# Patient Record
Sex: FEMALE | Race: BLACK OR AFRICAN AMERICAN | NOT HISPANIC OR LATINO | URBAN - METROPOLITAN AREA
[De-identification: names, ages, dates, MRNs, and addresses within clinical notes are randomized per-mention and may not be internally consistent; named-entity substitution may affect disease eponyms.]

---

## 2023-02-23 LAB
ALBUMIN (G/DL) IN SER/PLAS: 4.1 G/DL (ref 3.4–5)
ANION GAP IN SER/PLAS: 12 MMOL/L (ref 10–20)
CALCIUM (MG/DL) IN SER/PLAS: 8.9 MG/DL (ref 8.6–10.6)
CARBON DIOXIDE, TOTAL (MMOL/L) IN SER/PLAS: 27 MMOL/L (ref 21–32)
CHLORIDE (MMOL/L) IN SER/PLAS: 102 MMOL/L (ref 98–107)
CREATININE (MG/DL) IN SER/PLAS: 1.03 MG/DL (ref 0.5–1.05)
GFR FEMALE: 59 ML/MIN/1.73M2
GLUCOSE (MG/DL) IN SER/PLAS: 85 MG/DL (ref 74–99)
PHOSPHATE (MG/DL) IN SER/PLAS: 2.5 MG/DL (ref 2.5–4.9)
POTASSIUM (MMOL/L) IN SER/PLAS: 3.7 MMOL/L (ref 3.5–5.3)
SODIUM (MMOL/L) IN SER/PLAS: 137 MMOL/L (ref 136–145)
UREA NITROGEN (MG/DL) IN SER/PLAS: 9 MG/DL (ref 6–23)

## 2023-02-24 LAB
APPEARANCE, URINE: CLEAR
BILIRUBIN, URINE: NEGATIVE
BLOOD, URINE: NEGATIVE
COLOR, URINE: YELLOW
GLUCOSE, URINE: NEGATIVE MG/DL
KETONES, URINE: NEGATIVE MG/DL
LEUKOCYTE ESTERASE, URINE: NEGATIVE
NITRITE, URINE: NEGATIVE
PH, URINE: 6 (ref 5–8)
PROTEIN, URINE: NEGATIVE MG/DL
SPECIFIC GRAVITY, URINE: 1.02 (ref 1–1.03)
URINE CULTURE: NORMAL
UROBILINOGEN, URINE: <2 MG/DL (ref 0–1.9)

## 2023-03-03 ENCOUNTER — DOCUMENTATION (OUTPATIENT)
Dept: CARE COORDINATION | Facility: CLINIC | Age: 68
End: 2023-03-03
Payer: MEDICARE

## 2023-03-03 ENCOUNTER — DOCUMENTATION (OUTPATIENT)
Dept: PRIMARY CARE | Facility: CLINIC | Age: 68
End: 2023-03-03
Payer: MEDICARE

## 2023-03-22 ENCOUNTER — TELEPHONE (OUTPATIENT)
Dept: PRIMARY CARE | Facility: CLINIC | Age: 68
End: 2023-03-22
Payer: MEDICARE

## 2023-03-22 PROBLEM — R23.8 SKIN IRRITATION: Status: ACTIVE | Noted: 2023-03-22

## 2023-03-22 PROBLEM — N28.9 RENAL INSUFFICIENCY: Status: ACTIVE | Noted: 2023-03-22

## 2023-03-22 PROBLEM — M25.512 LEFT SHOULDER PAIN: Status: ACTIVE | Noted: 2023-03-22

## 2023-03-22 PROBLEM — M54.9 BACK PAIN: Status: ACTIVE | Noted: 2023-03-22

## 2023-03-22 PROBLEM — K62.5 RECTAL BLEEDING: Status: ACTIVE | Noted: 2023-03-22

## 2023-03-22 PROBLEM — S39.012A STRAIN OF LUMBAR REGION: Status: ACTIVE | Noted: 2023-03-22

## 2023-03-22 PROBLEM — L30.9 DERMATITIS: Status: ACTIVE | Noted: 2023-03-22

## 2023-03-22 PROBLEM — M79.645 PAIN OF LEFT THUMB: Status: ACTIVE | Noted: 2023-03-22

## 2023-03-22 PROBLEM — M79.644 PAIN OF BOTH THUMBS: Status: ACTIVE | Noted: 2023-03-22

## 2023-03-22 PROBLEM — R05.9 COUGH IN ADULT: Status: ACTIVE | Noted: 2023-03-22

## 2023-03-22 PROBLEM — M79.645 PAIN OF BOTH THUMBS: Status: ACTIVE | Noted: 2023-03-22

## 2023-03-22 PROBLEM — R51.9 HEADACHE: Status: ACTIVE | Noted: 2023-03-22

## 2023-03-22 PROBLEM — L98.9 SKIN LESION OF FACE: Status: ACTIVE | Noted: 2023-03-22

## 2023-03-22 PROBLEM — R10.9 LT FLANK PAIN: Status: ACTIVE | Noted: 2023-03-22

## 2023-03-22 PROBLEM — R10.2 PELVIC PAIN IN FEMALE: Status: ACTIVE | Noted: 2023-03-22

## 2023-03-22 PROBLEM — E66.3 OVERWEIGHT WITH BODY MASS INDEX (BMI) OF 25 TO 25.9 IN ADULT: Status: ACTIVE | Noted: 2023-03-22

## 2023-03-22 PROBLEM — M79.644 PAIN OF RIGHT THUMB: Status: ACTIVE | Noted: 2023-03-22

## 2023-03-22 PROBLEM — M79.671 RIGHT FOOT PAIN: Status: ACTIVE | Noted: 2023-03-22

## 2023-03-22 PROBLEM — M54.50 CHRONIC BILATERAL LOW BACK PAIN WITHOUT SCIATICA: Status: ACTIVE | Noted: 2023-03-22

## 2023-03-22 PROBLEM — G89.29 CHRONIC BILATERAL LOW BACK PAIN WITHOUT SCIATICA: Status: ACTIVE | Noted: 2023-03-22

## 2023-03-22 PROBLEM — F11.90 OPIOID USE: Status: ACTIVE | Noted: 2023-03-22

## 2023-03-22 PROBLEM — M79.89 SWELLING OF THIGH: Status: ACTIVE | Noted: 2023-03-22

## 2023-03-22 PROBLEM — E23.0 HYPOPITUITARISM (MULTI): Status: ACTIVE | Noted: 2023-03-22

## 2023-03-22 PROBLEM — K21.9 GERD (GASTROESOPHAGEAL REFLUX DISEASE): Status: ACTIVE | Noted: 2023-03-22

## 2023-03-22 PROBLEM — E23.2 DIABETES INSIPIDUS (MULTI): Status: ACTIVE | Noted: 2023-03-22

## 2023-03-22 PROBLEM — G25.0 BENIGN HEAD TREMOR: Status: ACTIVE | Noted: 2023-03-22

## 2023-03-22 PROBLEM — M81.0 OSTEOPOROSIS: Status: ACTIVE | Noted: 2023-03-22

## 2023-03-22 PROBLEM — N63.0 LUMP, BREAST: Status: ACTIVE | Noted: 2023-03-22

## 2023-03-22 PROBLEM — H57.89 EYE REDNESS: Status: ACTIVE | Noted: 2023-03-22

## 2023-03-22 PROBLEM — U07.1 COVID-19 VIRUS INFECTION: Status: ACTIVE | Noted: 2023-03-22

## 2023-03-22 RX ORDER — DESONIDE 0.5 MG/G
CREAM TOPICAL
COMMUNITY
Start: 2022-05-25 | End: 2024-04-22 | Stop reason: SDUPTHER

## 2023-03-22 RX ORDER — HYDROCODONE BITARTRATE AND ACETAMINOPHEN 7.5; 325 MG/1; MG/1
1 TABLET ORAL EVERY 8 HOURS
COMMUNITY
End: 2023-05-11 | Stop reason: SDUPTHER

## 2023-03-22 RX ORDER — BENZONATATE 200 MG/1
200 CAPSULE ORAL 3 TIMES DAILY PRN
COMMUNITY

## 2023-03-22 RX ORDER — ALENDRONATE SODIUM 70 MG/1
TABLET ORAL
COMMUNITY

## 2023-03-22 RX ORDER — CLOBETASOL PROPIONATE 0.5 MG/G
OINTMENT TOPICAL
COMMUNITY
Start: 2020-12-30

## 2023-03-22 RX ORDER — VENLAFAXINE HYDROCHLORIDE 75 MG/1
1 TABLET, EXTENDED RELEASE ORAL DAILY
COMMUNITY
Start: 2020-07-10

## 2023-03-22 RX ORDER — DESMOPRESSIN ACETATE 0.1 MG/1
TABLET ORAL
COMMUNITY

## 2023-03-22 RX ORDER — ERGOCALCIFEROL 1.25 MG/1
CAPSULE ORAL
COMMUNITY

## 2023-03-22 RX ORDER — LEVOTHYROXINE SODIUM 112 UG/1
TABLET ORAL
COMMUNITY

## 2023-03-22 RX ORDER — HYDROCORTISONE 5 MG/1
TABLET ORAL
COMMUNITY

## 2023-03-22 RX ORDER — MELOXICAM 15 MG/1
1 TABLET ORAL DAILY
COMMUNITY
Start: 2020-12-09

## 2023-03-22 RX ORDER — NALOXONE HYDROCHLORIDE 4 MG/.1ML
0.1 SPRAY NASAL ONCE AS NEEDED
COMMUNITY
Start: 2021-03-22

## 2023-03-23 ENCOUNTER — OFFICE VISIT (OUTPATIENT)
Dept: PRIMARY CARE | Facility: CLINIC | Age: 68
End: 2023-03-23
Payer: MEDICARE

## 2023-03-23 VITALS
WEIGHT: 157 LBS | SYSTOLIC BLOOD PRESSURE: 121 MMHG | HEIGHT: 65 IN | OXYGEN SATURATION: 95 % | DIASTOLIC BLOOD PRESSURE: 77 MMHG | BODY MASS INDEX: 26.16 KG/M2 | HEART RATE: 72 BPM | TEMPERATURE: 98.3 F

## 2023-03-23 DIAGNOSIS — R05.8 POST-VIRAL COUGH SYNDROME: Primary | ICD-10-CM

## 2023-03-23 PROCEDURE — 1160F RVW MEDS BY RX/DR IN RCRD: CPT | Performed by: FAMILY MEDICINE

## 2023-03-23 PROCEDURE — 1036F TOBACCO NON-USER: CPT | Performed by: FAMILY MEDICINE

## 2023-03-23 PROCEDURE — 99213 OFFICE O/P EST LOW 20 MIN: CPT | Performed by: FAMILY MEDICINE

## 2023-03-23 PROCEDURE — 1159F MED LIST DOCD IN RCRD: CPT | Performed by: FAMILY MEDICINE

## 2023-03-23 RX ORDER — PREDNISONE 20 MG/1
20 TABLET ORAL 2 TIMES DAILY
Qty: 10 TABLET | Refills: 0 | Status: SHIPPED | OUTPATIENT
Start: 2023-03-23 | End: 2023-03-28

## 2023-03-23 ASSESSMENT — PATIENT HEALTH QUESTIONNAIRE - PHQ9
2. FEELING DOWN, DEPRESSED OR HOPELESS: NOT AT ALL
SUM OF ALL RESPONSES TO PHQ9 QUESTIONS 1 AND 2: 0
1. LITTLE INTEREST OR PLEASURE IN DOING THINGS: NOT AT ALL

## 2023-03-23 ASSESSMENT — PAIN SCALES - GENERAL: PAINLEVEL: 6

## 2023-03-23 ASSESSMENT — ENCOUNTER SYMPTOMS: SORE THROAT: 1

## 2023-03-23 NOTE — PATIENT INSTRUCTIONS
Ongoing cough since having Covid a month ago.  Will use prednisone for 5 days to help settle down inflammation in lungs.  Can still use robitussin if needed.  If not improving, will check CXR.  Call if any additional symptoms.

## 2023-03-24 ENCOUNTER — DOCUMENTATION (OUTPATIENT)
Dept: PRIMARY CARE | Facility: CLINIC | Age: 68
End: 2023-03-24
Payer: MEDICARE

## 2023-03-24 NOTE — PROGRESS NOTES
Patient has met target of no readmission for (30 ) days post (hospital, discharge and is graduated from Transitional Care Management program at this time.

## 2023-04-28 ENCOUNTER — OFFICE VISIT (OUTPATIENT)
Dept: PRIMARY CARE | Facility: CLINIC | Age: 68
End: 2023-04-28
Payer: MEDICARE

## 2023-04-28 VITALS
BODY MASS INDEX: 27.12 KG/M2 | HEART RATE: 71 BPM | SYSTOLIC BLOOD PRESSURE: 120 MMHG | OXYGEN SATURATION: 98 % | HEIGHT: 65 IN | WEIGHT: 162.8 LBS | DIASTOLIC BLOOD PRESSURE: 78 MMHG | TEMPERATURE: 97.5 F

## 2023-04-28 DIAGNOSIS — H66.90 ACUTE OTITIS MEDIA, UNSPECIFIED OTITIS MEDIA TYPE: ICD-10-CM

## 2023-04-28 DIAGNOSIS — R05.9 COUGH IN ADULT: Primary | ICD-10-CM

## 2023-04-28 PROCEDURE — 1160F RVW MEDS BY RX/DR IN RCRD: CPT | Performed by: NURSE PRACTITIONER

## 2023-04-28 PROCEDURE — 1036F TOBACCO NON-USER: CPT | Performed by: NURSE PRACTITIONER

## 2023-04-28 PROCEDURE — 99213 OFFICE O/P EST LOW 20 MIN: CPT | Performed by: NURSE PRACTITIONER

## 2023-04-28 PROCEDURE — 1159F MED LIST DOCD IN RCRD: CPT | Performed by: NURSE PRACTITIONER

## 2023-04-28 RX ORDER — DOXYCYCLINE 100 MG/1
100 CAPSULE ORAL 2 TIMES DAILY
Qty: 20 CAPSULE | Refills: 0 | Status: SHIPPED | OUTPATIENT
Start: 2023-04-28 | End: 2023-05-08

## 2023-04-28 ASSESSMENT — PAIN SCALES - GENERAL: PAINLEVEL: 2

## 2023-04-28 ASSESSMENT — PATIENT HEALTH QUESTIONNAIRE - PHQ9
SUM OF ALL RESPONSES TO PHQ9 QUESTIONS 1 AND 2: 0
2. FEELING DOWN, DEPRESSED OR HOPELESS: NOT AT ALL
1. LITTLE INTEREST OR PLEASURE IN DOING THINGS: NOT AT ALL

## 2023-04-28 NOTE — PATIENT INSTRUCTIONS
Doxycycline as prescribed for persisting symptoms.  Continue to use Tessalon as needed for dry cough. Cough may take several more weeks/months to completely resolve.  Follow up with any worsening of symptoms.

## 2023-04-28 NOTE — PROGRESS NOTES
"Subjective   Patient ID: Michael Glover is a 67 y.o. female who presents for Sinus Problem (Prior covid19 1/2023 sore throat).    Presents for sore throat and cough since covid infection in January.  Seen a few weeks ago and treated with prednisone. States did nothing for the cough. Uncertain of helped much with sore throat.  Cough remains dry at this time. Using Tessalon as needed. Triggered by talking too much. Does not limit exertion.  Sore throat with post nasal drainage. Feels tight in neck.   Denies any fever or SOB.         Review of Systems    Objective   /78 (BP Location: Right arm, Patient Position: Sitting, BP Cuff Size: Large adult)   Pulse 71   Temp 36.4 °C (97.5 °F) (Oral)   Ht 1.638 m (5' 4.5\")   Wt 73.8 kg (162 lb 12.8 oz)   SpO2 98%   BMI 27.51 kg/m²     Physical Exam  Vitals and nursing note reviewed.   Constitutional:       General: She is not in acute distress.     Appearance: Normal appearance.   HENT:      Head: Normocephalic.      Right Ear: Ear canal and external ear normal. A middle ear effusion is present. Tympanic membrane is injected.      Left Ear: Ear canal and external ear normal. A middle ear effusion is present.      Nose: Congestion present.      Mouth/Throat:      Mouth: Mucous membranes are moist.      Pharynx: Oropharynx is clear. No posterior oropharyngeal erythema.   Eyes:      Conjunctiva/sclera: Conjunctivae normal.   Cardiovascular:      Rate and Rhythm: Normal rate and regular rhythm.      Heart sounds: No murmur heard.  Pulmonary:      Effort: Pulmonary effort is normal. No respiratory distress.      Breath sounds: Normal breath sounds. No wheezing or rhonchi.   Lymphadenopathy:      Cervical: Cervical adenopathy present.         Assessment/Plan   Diagnoses and all orders for this visit:  Cough in adult  -     doxycycline (Vibramycin) 100 mg capsule; Take 1 capsule (100 mg) by mouth 2 times a day for 10 days.  Acute otitis media, unspecified otitis media " type  -     doxycycline (Vibramycin) 100 mg capsule; Take 1 capsule (100 mg) by mouth 2 times a day for 10 days.

## 2023-05-11 ENCOUNTER — OFFICE VISIT (OUTPATIENT)
Dept: PRIMARY CARE | Facility: CLINIC | Age: 68
End: 2023-05-11
Payer: MEDICARE

## 2023-05-11 VITALS
DIASTOLIC BLOOD PRESSURE: 80 MMHG | OXYGEN SATURATION: 98 % | HEART RATE: 85 BPM | WEIGHT: 160 LBS | HEIGHT: 64 IN | BODY MASS INDEX: 27.31 KG/M2 | RESPIRATION RATE: 16 BRPM | SYSTOLIC BLOOD PRESSURE: 120 MMHG

## 2023-05-11 DIAGNOSIS — M54.50 CHRONIC BILATERAL LOW BACK PAIN WITHOUT SCIATICA: ICD-10-CM

## 2023-05-11 DIAGNOSIS — E23.0 HYPOPITUITARISM (MULTI): ICD-10-CM

## 2023-05-11 DIAGNOSIS — G89.29 CHRONIC BILATERAL LOW BACK PAIN WITHOUT SCIATICA: ICD-10-CM

## 2023-05-11 DIAGNOSIS — G89.29 CHRONIC PAIN OF BOTH KNEES: Primary | ICD-10-CM

## 2023-05-11 DIAGNOSIS — M25.562 CHRONIC PAIN OF BOTH KNEES: Primary | ICD-10-CM

## 2023-05-11 DIAGNOSIS — J31.2 CHRONIC SORE THROAT: ICD-10-CM

## 2023-05-11 DIAGNOSIS — M25.561 CHRONIC PAIN OF BOTH KNEES: Primary | ICD-10-CM

## 2023-05-11 PROCEDURE — 1159F MED LIST DOCD IN RCRD: CPT | Performed by: FAMILY MEDICINE

## 2023-05-11 PROCEDURE — 99213 OFFICE O/P EST LOW 20 MIN: CPT | Performed by: FAMILY MEDICINE

## 2023-05-11 PROCEDURE — 1160F RVW MEDS BY RX/DR IN RCRD: CPT | Performed by: FAMILY MEDICINE

## 2023-05-11 PROCEDURE — 1036F TOBACCO NON-USER: CPT | Performed by: FAMILY MEDICINE

## 2023-05-11 RX ORDER — HYDROCODONE BITARTRATE AND ACETAMINOPHEN 7.5; 325 MG/1; MG/1
1 TABLET ORAL EVERY 8 HOURS
Qty: 20 TABLET | Refills: 0 | Status: SHIPPED | OUTPATIENT
Start: 2023-05-11 | End: 2023-07-31 | Stop reason: SDUPTHER

## 2023-05-11 ASSESSMENT — PATIENT HEALTH QUESTIONNAIRE - PHQ9
2. FEELING DOWN, DEPRESSED OR HOPELESS: NOT AT ALL
1. LITTLE INTEREST OR PLEASURE IN DOING THINGS: NOT AT ALL
SUM OF ALL RESPONSES TO PHQ9 QUESTIONS 1 AND 2: 0

## 2023-05-11 NOTE — PROGRESS NOTES
"Subjective   Patient ID: Michael Glover is a 67 y.o. female who presents for Knee Pain (Both knees hurt , right knee swolling).  Both knees hurt for a while, but getting worse.  Right is more than left.  Right is swelling.  No giving out   Has a right knee sleeve, wears at times.  No acute injury.    Sore throat still hurting, since January when had covid.  Feels like is closing inside  Some PND, but not all the time.  Has had ATB, but no feels different.    Knee Pain         Review of Systems    Objective   /80 (BP Location: Left arm, Patient Position: Sitting, BP Cuff Size: Adult)   Pulse 85   Resp 16   Ht 1.626 m (5' 4\")   Wt 72.6 kg (160 lb)   SpO2 98%   BMI 27.46 kg/m²     Physical Exam  Constitutional:       Appearance: Normal appearance.   Cardiovascular:      Rate and Rhythm: Normal rate and regular rhythm.      Heart sounds: No murmur heard.  Pulmonary:      Effort: Pulmonary effort is normal.      Breath sounds: Normal breath sounds.   Musculoskeletal:      Right knee: Swelling present. Normal range of motion.      Instability Tests: Medial Emma test negative and lateral Emma test negative.      Left knee: Swelling present. Normal range of motion.      Instability Tests: Medial Emma test negative and lateral Emma test negative.      Right lower leg: No edema.      Left lower leg: No edema.   Lymphadenopathy:      Cervical: No cervical adenopathy.   Neurological:      Mental Status: She is alert.           Assessment/Plan   Problem List Items Addressed This Visit          Nervous    Hypopituitarism (CMS/HCC)     Stable, followed by endocrinologist            Other    Chronic bilateral low back pain without sciatica     Using hydrocodone on limited bases.   Urine Drug Screen and Controlled Substance Agreement done 8/29/22           Relevant Medications    HYDROcodone-acetaminophen (Norco) 7.5-325 mg tablet     Other Visit Diagnoses       Chronic pain of both knees    -  Primary    " Relevant Orders    Referral to Orthopaedic Surgery    XR knee 3 views bilateral (Completed)    Chronic sore throat        Relevant Orders    Referral to ENT

## 2023-05-14 NOTE — ASSESSMENT & PLAN NOTE
Using hydrocodone on limited bases.   Urine Drug Screen and Controlled Substance Agreement done 8/29/22

## 2023-05-14 NOTE — PATIENT INSTRUCTIONS
Chronic, worsening knee pain.  Check X-Rays.  Refer to orthopedist.  Can use knee brace for support.    Ongoing sore throat  Refer to ENT.    For back pain, hydrocodone used on limited basis.   Urine Drug Screen and Controlled Substance Agreement done 8/29/22.

## 2023-06-09 ENCOUNTER — TELEPHONE (OUTPATIENT)
Dept: PRIMARY CARE | Facility: CLINIC | Age: 68
End: 2023-06-09

## 2023-06-15 ENCOUNTER — APPOINTMENT (OUTPATIENT)
Dept: PRIMARY CARE | Facility: CLINIC | Age: 68
End: 2023-06-15
Payer: MEDICARE

## 2023-07-31 ENCOUNTER — OFFICE VISIT (OUTPATIENT)
Dept: PRIMARY CARE | Facility: CLINIC | Age: 68
End: 2023-07-31
Payer: MEDICARE

## 2023-07-31 VITALS
BODY MASS INDEX: 28.51 KG/M2 | HEART RATE: 78 BPM | TEMPERATURE: 97.9 F | RESPIRATION RATE: 16 BRPM | OXYGEN SATURATION: 99 % | DIASTOLIC BLOOD PRESSURE: 80 MMHG | WEIGHT: 167 LBS | SYSTOLIC BLOOD PRESSURE: 120 MMHG | HEIGHT: 64 IN

## 2023-07-31 DIAGNOSIS — K14.6 BURNING TONGUE: ICD-10-CM

## 2023-07-31 DIAGNOSIS — G89.29 CHRONIC BILATERAL LOW BACK PAIN WITHOUT SCIATICA: ICD-10-CM

## 2023-07-31 DIAGNOSIS — J02.9 SORE THROAT: Primary | ICD-10-CM

## 2023-07-31 DIAGNOSIS — K21.9 GASTROESOPHAGEAL REFLUX DISEASE WITHOUT ESOPHAGITIS: ICD-10-CM

## 2023-07-31 DIAGNOSIS — M54.50 CHRONIC BILATERAL LOW BACK PAIN WITHOUT SCIATICA: ICD-10-CM

## 2023-07-31 DIAGNOSIS — R35.0 URINARY FREQUENCY: ICD-10-CM

## 2023-07-31 LAB
POC APPEARANCE, URINE: CLEAR
POC BILIRUBIN, URINE: NEGATIVE
POC BLOOD, URINE: NEGATIVE
POC COLOR, URINE: YELLOW
POC GLUCOSE, URINE: NEGATIVE MG/DL
POC KETONES, URINE: NEGATIVE MG/DL
POC LEUKOCYTES, URINE: NEGATIVE
POC NITRITE,URINE: NEGATIVE
POC PH, URINE: 7 PH
POC PROTEIN, URINE: NEGATIVE MG/DL
POC SPECIFIC GRAVITY, URINE: 1.02
POC UROBILINOGEN, URINE: 0.2 EU/DL

## 2023-07-31 PROCEDURE — 1160F RVW MEDS BY RX/DR IN RCRD: CPT | Performed by: FAMILY MEDICINE

## 2023-07-31 PROCEDURE — 1159F MED LIST DOCD IN RCRD: CPT | Performed by: FAMILY MEDICINE

## 2023-07-31 PROCEDURE — 1036F TOBACCO NON-USER: CPT | Performed by: FAMILY MEDICINE

## 2023-07-31 PROCEDURE — 81003 URINALYSIS AUTO W/O SCOPE: CPT | Performed by: FAMILY MEDICINE

## 2023-07-31 PROCEDURE — 99213 OFFICE O/P EST LOW 20 MIN: CPT | Performed by: FAMILY MEDICINE

## 2023-07-31 PROCEDURE — 1126F AMNT PAIN NOTED NONE PRSNT: CPT | Performed by: FAMILY MEDICINE

## 2023-07-31 RX ORDER — HYDROCODONE BITARTRATE AND ACETAMINOPHEN 7.5; 325 MG/1; MG/1
1 TABLET ORAL EVERY 8 HOURS
Qty: 20 TABLET | Refills: 0 | Status: SHIPPED | OUTPATIENT
Start: 2023-07-31 | End: 2023-11-15 | Stop reason: SDUPTHER

## 2023-07-31 ASSESSMENT — PAIN SCALES - GENERAL: PAINLEVEL: 0-NO PAIN

## 2023-07-31 ASSESSMENT — PATIENT HEALTH QUESTIONNAIRE - PHQ9
SUM OF ALL RESPONSES TO PHQ9 QUESTIONS 1 AND 2: 0
2. FEELING DOWN, DEPRESSED OR HOPELESS: NOT AT ALL
1. LITTLE INTEREST OR PLEASURE IN DOING THINGS: NOT AT ALL
SUM OF ALL RESPONSES TO PHQ9 QUESTIONS 1 AND 2: 0
1. LITTLE INTEREST OR PLEASURE IN DOING THINGS: NOT AT ALL
2. FEELING DOWN, DEPRESSED OR HOPELESS: NOT AT ALL

## 2023-07-31 ASSESSMENT — ENCOUNTER SYMPTOMS
SORE THROAT: 1
HEARTBURN: 1

## 2023-07-31 NOTE — PROGRESS NOTES
"Subjective   Patient ID: Michael Glover is a 67 y.o. female who presents for Heartburn and Sore Throat.  Went to ED over the weekend.  She  was having pain in throat and on tongue.  She had stopped protonix, so thought maybe GERD.  Since it was feeling like throat was tight, she went to  ED.  Exam was unremarkable, and CT  of soft tissues of neck was normal.  CBC similar to baseline  and CRP 1.0  She was given dexamethasone  and IV Toradol.  Told to restart protonix.  Was felling better.    Seemed like flared up again today.  Tongue burns, and throat is sore.  Does  not feel like  throat is closing.  Ears  also hurt.    Eye is still jumping, on left more than right.  Saw neurologist, will be seeing eye doctor.    Will be travelling, so would like refill of vicodin.   Uses infrequently, when  back pain flares up.    Having urinary frequency, but drinks a lot of water.  No burning or other UTI symptoms.    Heartburn  She complains of heartburn and a sore throat.   Sore Throat         Review of Systems   HENT:  Positive for sore throat.    Gastrointestinal:  Positive for heartburn.       Objective   /80 (BP Location: Left arm, Patient Position: Sitting, BP Cuff Size: Adult)   Pulse 78   Temp 36.6 °C (97.9 °F) (Oral)   Resp 16   Ht 1.626 m (5' 4\")   Wt 75.8 kg (167 lb)   SpO2 99%   BMI 28.67 kg/m²     Physical Exam  Vitals reviewed.   Constitutional:       Appearance: Normal appearance.   HENT:      Right Ear: Tympanic membrane normal.      Left Ear: Tympanic membrane normal.      Nose: Nose normal.      Mouth/Throat:      Mouth: Mucous membranes are moist.      Tongue: No lesions. Tongue does not deviate from midline.      Pharynx: No pharyngeal swelling, oropharyngeal exudate or posterior oropharyngeal erythema.   Cardiovascular:      Rate and Rhythm: Normal rate and regular rhythm.   Pulmonary:      Effort: Pulmonary effort is normal.      Breath sounds: Normal breath sounds.   Musculoskeletal:      " Cervical back: Normal range of motion and neck supple.   Lymphadenopathy:      Cervical: No cervical adenopathy.   Neurological:      Mental Status: She is alert.           Assessment/Plan   Problem List Items Addressed This Visit       Chronic bilateral low back pain without sciatica    Relevant Medications    HYDROcodone-acetaminophen (Norco) 7.5-325 mg tablet    GERD (gastroesophageal reflux disease)     Other Visit Diagnoses       Sore throat    -  Primary    Urinary frequency        Relevant Orders    POCT UA Automated manually resulted (Completed)    Burning tongue

## 2023-08-01 NOTE — PATIENT INSTRUCTIONS
Follow up from ED visit for sore throat and tongue pain.  Symptoms improved with toradol and dexamethasone, but returning again.  Exam is unremarkable.  Follow up with ENT.  Saw Dr. Watt previously.  Restart Protonix for GERD, which may be playing a part in symptoms.  Can use naproxen as needed, that had been prescribed previously.  If feel like throat closing or tongue swelling, go to ED.    Urinary frequency, with normal urinalysis.  No evidence of infection.    For intermittent severe back pain, hydrocodone refilled.  I have personally reviewed the OARRS report for this patient.  This report is documented into the EMR.  I have considered the risks of abuse, dependence, addiction and diversion.

## 2023-11-15 DIAGNOSIS — M54.50 CHRONIC BILATERAL LOW BACK PAIN WITHOUT SCIATICA: ICD-10-CM

## 2023-11-15 DIAGNOSIS — G89.29 CHRONIC BILATERAL LOW BACK PAIN WITHOUT SCIATICA: ICD-10-CM

## 2023-11-15 RX ORDER — HYDROCODONE BITARTRATE AND ACETAMINOPHEN 7.5; 325 MG/1; MG/1
1 TABLET ORAL EVERY 8 HOURS
Qty: 20 TABLET | Refills: 0 | Status: SHIPPED | OUTPATIENT
Start: 2023-11-15 | End: 2024-03-19 | Stop reason: SDUPTHER

## 2023-11-15 NOTE — TELEPHONE ENCOUNTER
I have personally reviewed the OARRS report for this patient.  This report is documented into the EMR.  I have considered the risks of abuse, dependence, addiction and diversion.  Needs  Urine Drug Screen and Controlled Substance Agreement

## 2023-12-06 ENCOUNTER — TELEPHONE (OUTPATIENT)
Dept: PRIMARY CARE | Facility: CLINIC | Age: 68
End: 2023-12-06
Payer: MEDICARE

## 2023-12-07 ENCOUNTER — OFFICE VISIT (OUTPATIENT)
Dept: PRIMARY CARE | Facility: CLINIC | Age: 68
End: 2023-12-07
Payer: MEDICARE

## 2023-12-07 ENCOUNTER — ANCILLARY PROCEDURE (OUTPATIENT)
Dept: RADIOLOGY | Facility: CLINIC | Age: 68
End: 2023-12-07
Payer: MEDICARE

## 2023-12-07 VITALS
HEART RATE: 75 BPM | OXYGEN SATURATION: 96 % | SYSTOLIC BLOOD PRESSURE: 120 MMHG | DIASTOLIC BLOOD PRESSURE: 85 MMHG | TEMPERATURE: 97.5 F | BODY MASS INDEX: 30.38 KG/M2 | WEIGHT: 177 LBS

## 2023-12-07 DIAGNOSIS — M54.2 NECK PAIN: Primary | ICD-10-CM

## 2023-12-07 DIAGNOSIS — M54.2 NECK PAIN: ICD-10-CM

## 2023-12-07 PROCEDURE — 72050 X-RAY EXAM NECK SPINE 4/5VWS: CPT | Performed by: RADIOLOGY

## 2023-12-07 PROCEDURE — 1126F AMNT PAIN NOTED NONE PRSNT: CPT | Performed by: FAMILY MEDICINE

## 2023-12-07 PROCEDURE — 1160F RVW MEDS BY RX/DR IN RCRD: CPT | Performed by: FAMILY MEDICINE

## 2023-12-07 PROCEDURE — 99213 OFFICE O/P EST LOW 20 MIN: CPT | Performed by: FAMILY MEDICINE

## 2023-12-07 PROCEDURE — 1159F MED LIST DOCD IN RCRD: CPT | Performed by: FAMILY MEDICINE

## 2023-12-07 PROCEDURE — 72050 X-RAY EXAM NECK SPINE 4/5VWS: CPT | Mod: FY

## 2023-12-07 PROCEDURE — 1036F TOBACCO NON-USER: CPT | Performed by: FAMILY MEDICINE

## 2023-12-07 ASSESSMENT — PATIENT HEALTH QUESTIONNAIRE - PHQ9
SUM OF ALL RESPONSES TO PHQ9 QUESTIONS 1 & 2: 0
1. LITTLE INTEREST OR PLEASURE IN DOING THINGS: NOT AT ALL
2. FEELING DOWN, DEPRESSED OR HOPELESS: NOT AT ALL

## 2023-12-07 NOTE — PROGRESS NOTES
"Subjective   Patient ID: Michael Glover is a 68 y.o. female who presents for No chief complaint on file..  Back  of neck swelling she noticed since last week.  Is burning sometimes, but mostly feels stiff.   Not real pain.  No pain or tingling down arms.  Hormone and other blood levels have been good.  BMD shows  osteoporosis, on  Fosamax.  CT of neck for soft tissue in July:\"Cervical spine and remaining osseous structures: Alignment is normal. No   discrete osteolytic or osteoblastic process.  Mild spondylotic changes in   the visualized spine\"          Review of Systems    Objective   /85   Pulse 75   Temp 36.4 °C (97.5 °F)   Wt 80.3 kg (177 lb)   SpO2 96%   BMI 30.38 kg/m²     Physical Exam  Vitals reviewed.   Constitutional:       Appearance: Normal appearance.   Musculoskeletal:      Cervical back: Swelling (over base of cervical spine. Soft tissue,not tender.) and tenderness (over lower cervical spine,mild) present. No spasms. Normal range of motion.   Neurological:      Mental Status: She is alert.           Assessment/Plan   Problem List Items Addressed This Visit    None         "

## 2023-12-07 NOTE — PATIENT INSTRUCTIONS
Neck stiffness  with soft tissue swelling at the base.  Will check cervical spine X-Ray.  Consider PT to help with posture and range of motion.

## 2023-12-15 ENCOUNTER — TELEPHONE (OUTPATIENT)
Dept: PRIMARY CARE | Facility: CLINIC | Age: 68
End: 2023-12-15
Payer: MEDICARE

## 2023-12-15 DIAGNOSIS — G89.29 CHRONIC BILATERAL LOW BACK PAIN WITHOUT SCIATICA: Primary | ICD-10-CM

## 2023-12-15 DIAGNOSIS — M54.2 NECK PAIN: ICD-10-CM

## 2023-12-15 DIAGNOSIS — M54.50 CHRONIC BILATERAL LOW BACK PAIN WITHOUT SCIATICA: Primary | ICD-10-CM

## 2023-12-19 ENCOUNTER — LAB REQUISITION (OUTPATIENT)
Dept: LAB | Facility: HOSPITAL | Age: 68
End: 2023-12-19
Payer: MEDICARE

## 2023-12-19 DIAGNOSIS — N39.0 URINARY TRACT INFECTION, SITE NOT SPECIFIED: ICD-10-CM

## 2023-12-19 PROCEDURE — 87186 SC STD MICRODIL/AGAR DIL: CPT

## 2023-12-19 PROCEDURE — 87086 URINE CULTURE/COLONY COUNT: CPT

## 2023-12-22 LAB — BACTERIA UR CULT: ABNORMAL

## 2023-12-23 DIAGNOSIS — K21.9 GASTRO-ESOPHAGEAL REFLUX DISEASE WITHOUT ESOPHAGITIS: ICD-10-CM

## 2023-12-24 RX ORDER — OMEPRAZOLE 40 MG/1
40 CAPSULE, DELAYED RELEASE ORAL DAILY
Qty: 90 CAPSULE | Refills: 1 | OUTPATIENT
Start: 2023-12-24

## 2023-12-24 NOTE — TELEPHONE ENCOUNTER
Chart reviewed.  Ms. Glover no showed her last apt.  She needs to be seen in office before further refills authorized by Dr. Watt.

## 2024-01-17 ENCOUNTER — EVALUATION (OUTPATIENT)
Dept: PHYSICAL THERAPY | Facility: CLINIC | Age: 69
End: 2024-01-17
Payer: MEDICARE

## 2024-01-17 DIAGNOSIS — M43.6 STIFFNESS OF CERVICAL SPINE: ICD-10-CM

## 2024-01-17 DIAGNOSIS — M54.2 CERVICAL SPINE PAIN: Primary | ICD-10-CM

## 2024-01-17 DIAGNOSIS — M54.2 NECK PAIN: ICD-10-CM

## 2024-01-17 PROCEDURE — 97161 PT EVAL LOW COMPLEX 20 MIN: CPT | Mod: GP

## 2024-01-17 PROCEDURE — 97110 THERAPEUTIC EXERCISES: CPT | Mod: GP

## 2024-01-17 NOTE — PROGRESS NOTES
Physical Therapy  Physical Therapy Orthopedic Evaluation    Patient Name: Michael Glover  MRN: [id number]  Today's Date: 1/18/2024       Insurance:  Visit [place]: [place] of [place]  Authorization info: Auth needed   Insurance Type: Payor: [employer] MEDICARE / Plan: [facility] MEDICARE ADVANTAGE / Product Type: *No Product type* /      Current Problem  1. Cervical spine pain        2. Neck pain  Referral to Physical Therapy      3. Stiffness of cervical spine            Referred by: Dr. Hannah Cast     General:  General  Reason for Referral: Cervical pain and stiffness  Referred By: Hannah Cast MD    Precautions:  Precautions  Precautions Comment: Thyroid disorder, Hx of brain surgery in 1999  Medical History Form: Reviewed (scanned into chart)    Subjective:   FERNIE: Pt reports to evaluation due to neck pain. Pt has had this pain for the last 3-4 weeks. Pt is feeling her pain right in the middle of her cervical spine. Pt denies any radicular symptoms down her arms or dizziness. Pt mainly notices her pain gets worse in the middle of the day. Pt has also had some swelling in her neck as well. Pt will feel stiffness when she rotates her head from side to side. Pt has been doing some exercises on her own but does not feel like they are helping too much. Pt had imaging that revealed some disc degeneration and stenosis at the C4-6 level.     Previous Med Management: Imaging, exercise    PMH: Nothing related to her neck. Had brain surgery in 1999     Aggravating Factors: Not sure it is just always there     Relieving Factors: Heat, topical pain reliever    Pain/Symptom Scale:  Current: 4/10  Worst: 7/10  Best: 1/10  Location/Nature: In the middle of her cervical spine and describes as sometimes burning or pinching  Meds: Tylenol     PLOF: Pt has been able to do all activities but she just has some pain  ADL: No problems  Work: retired   Athletics: Workout routine   Recreation/ Hobbies: Line dancing    Goals: Pt would like to decrease her  pain and work on strengthening     Language: English      Red Flags: Do you have any of the following? No  Fever/chills, unexplained weight changes, dizziness/fainting, unexplained change in bowel or bladder functions, unexplained malaise or muscle weakness, night pain/sweats, numbness or tingling    Objective:  Palpation/ Observation   Pt had slight increase in kyphosis of thoracic spine. Pt had increased cervical paraspinal tightness upon palpation.     Shoulder ROM  WFL in all directions     Cervical ROM   Flexion: 30  Extension:45   R rot: 60  L rot: 70   R side bend: 10  L side bend: 10    Outcome Measures:  Other Measures  Neck Disability Index: 15/50     Treatments:  Access Code: [email or web address]  URL: [email or web address]  Date: 01/18/2024  Prepared by: Christiano Sam    Exercises  - Seated Passive Cervical Retraction  - 1 x daily - 7 x weekly - 3 sets - 10 reps  - Seated Assisted Cervical Rotation with Towel  - 1 x daily - 7 x weekly - 3 sets - 10 reps  - Seated Scapular Retraction  - 1 x daily - 7 x weekly - 3 sets - 10 reps  - Supine Shoulder Horizontal Abduction with Resistance  - 1 x daily - 7 x weekly - 3 sets - 10 reps    EDUCATION: Home exercise program, plan of care, activity modifications, pain management, and injury pathology       Assessment:     Patient is a 68 year old female that presents to physical therapy evaluation today due to complaints of cervical pain and stiffness. Patient impairments include flexibility deficits of cervical spine, strength deficits in scapular musculature, and motor control deficits including lack of neck control. Due to these impairments, the patients has the following functional limitations: pain with cervical rotation, difficulty sleeping, decreased activity tolerance, and an overall decrease in functional mobility. Skilled therapy recommended in order to to address their impairments and progress towards the associated functional goals. Prognosis is  good secondary to their current presentation and client understanding. The pt demonstrates a good understanding of their current plan of care, rehab expectations, and prognosis.          Plan:     Planned Interventions include: therapeutic exercise, self-care home management, manual therapy, therapeutic activities, gait training, neuromuscular coordination, vasopneumatic, dry needling, aquatic therapy  Frequency: 1 x Week  Duration: 8 Weeks  Goals: Set and discussed today  Active       PT Problem       PT Goals       Start:  01/18/24       1. Pt will decrease NDI to 5/50 or better in order to demonstrate a decrease in neck pain   2. Pt will increase cervical rotation, flexion, and extension ROM by a minimum of 5 degrees in order to demonstrate an increase in functional ROM   3. Pt will demonstrate independence and compliance with HEP with no increase in pain in order to demonstrate an increase in functional mobility  4.  Patient will be able to sleep through the night without any increase in neck pain.  5.  Patient will be able to perform all activities without any increase in cervical spine pain.             Plan of care was developed with input and agreement by the patient      Christiano Sam, PT

## 2024-01-18 PROBLEM — M43.6 STIFFNESS OF CERVICAL SPINE: Status: ACTIVE | Noted: 2024-01-18

## 2024-01-18 PROBLEM — M54.2 CERVICAL SPINE PAIN: Status: ACTIVE | Noted: 2024-01-18

## 2024-02-08 ENCOUNTER — APPOINTMENT (OUTPATIENT)
Dept: PHYSICAL THERAPY | Facility: CLINIC | Age: 69
End: 2024-02-08
Payer: MEDICARE

## 2024-02-12 NOTE — PROGRESS NOTES
"  Physical Therapy  Physical Therapy Orthopedic Evaluation    Patient Name: Michael Glover  MRN: [id number]  Today's Date: 2/13/2024  Time Calculation  Start Time: 0700  Stop Time: 0745  Time Calculation (min): 45 min    Insurance:  Visit number: 2 of [place]  Authorization info: Auth needed   Insurance Type: Payor: [employer] MEDICARE / Plan: [employer] MEDICARE ADVANTAGE / Product Type: *No Product type* /      Current Problem  1. Stiffness of cervical spine  Follow Up In Physical Therapy      2. Cervical spine pain  Follow Up In Physical Therapy          Referred by: Dr. Hannah Cast     General:       Precautions:     Medical History Form: Reviewed (scanned into chart)    Subjective:   My neck hurts in the middle . Jesenia been doing my exercises and it helps.          Previous Med Management: Imaging, exercise    PMH: Nothing related to her neck. Had brain surgery in 1999     Aggravating Factors: Not sure it is just always there     Relieving Factors: Heat, topical pain reliever    Pain/Symptom Scale:  Current: 3/10  Worst: 7/10  Best: 1/10  Location/Nature: In the middle of her cervical spine and describes as sometimes burning or pinching  Meds: Tylenol           Objective:  Palpation/ Observation   Pt had slight increase in kyphosis of thoracic spine. Pt had increased cervical paraspinal tightness upon palpation.     Shoulder ROM  WFL in all directions     Cervical ROM   Flexion: 30  Extension:45   R rot: 60  L rot: 70   R side bend: 10  L side bend: 10    Outcome Measures:        Treatments:  Access Code: [email or web address]  URL: [email or web address]  Date: 01/18/2024  Prepared by: Christiano Sam    Manual (15')  Seated STM to B UTM   Supine STM to B UTM     Attended stim with thermaprobe to B UTM x 10 min with 5 bar heating    Mechanical traction 17#/0#  60\"/20\"  x 10 min     EDUCATION: Home exercise program, plan of care, activity modifications, pain management, and injury pathology       Assessment:     Patient " demonstrated improved cervical rotation and reports decreased tightness at end of session.      Plan:     Planned Interventions include: therapeutic exercise, self-care home management, manual therapy, therapeutic activities, gait training, neuromuscular coordination, vasopneumatic, dry needling, aquatic therapy  Frequency: 1 x Week  Duration: 8 Weeks  Goals: Set and discussed today  Active       PT Problem       PT Goals       Start:  01/18/24       1. Pt will decrease NDI to 5/50 or better in order to demonstrate a decrease in neck pain   2. Pt will increase cervical rotation, flexion, and extension ROM by a minimum of 5 degrees in order to demonstrate an increase in functional ROM   3. Pt will demonstrate independence and compliance with HEP with no increase in pain in order to demonstrate an increase in functional mobility  4.  Patient will be able to sleep through the night without any increase in neck pain.  5.  Patient will be able to perform all activities without any increase in cervical spine pain.             Plan of care was developed with input and agreement by the patient      Lucinda Massey PTA

## 2024-02-13 ENCOUNTER — TREATMENT (OUTPATIENT)
Dept: PHYSICAL THERAPY | Facility: CLINIC | Age: 69
End: 2024-02-13
Payer: MEDICARE

## 2024-02-13 DIAGNOSIS — M54.2 CERVICAL SPINE PAIN: ICD-10-CM

## 2024-02-13 DIAGNOSIS — M43.6 STIFFNESS OF CERVICAL SPINE: Primary | ICD-10-CM

## 2024-02-13 PROCEDURE — 97140 MANUAL THERAPY 1/> REGIONS: CPT | Mod: GP,59,CQ

## 2024-02-13 PROCEDURE — 97032 APPL MODALITY 1+ESTIM EA 15: CPT | Mod: GP,CQ

## 2024-02-13 PROCEDURE — 97012 MECHANICAL TRACTION THERAPY: CPT | Mod: GP,CQ

## 2024-02-20 ENCOUNTER — TREATMENT (OUTPATIENT)
Dept: PHYSICAL THERAPY | Facility: CLINIC | Age: 69
End: 2024-02-20
Payer: MEDICARE

## 2024-02-20 DIAGNOSIS — M54.2 CERVICAL SPINE PAIN: ICD-10-CM

## 2024-02-20 DIAGNOSIS — M43.6 STIFFNESS OF CERVICAL SPINE: ICD-10-CM

## 2024-02-20 PROCEDURE — 97012 MECHANICAL TRACTION THERAPY: CPT | Mod: GP

## 2024-02-20 PROCEDURE — 97110 THERAPEUTIC EXERCISES: CPT | Mod: GP

## 2024-02-20 PROCEDURE — 97032 APPL MODALITY 1+ESTIM EA 15: CPT | Mod: GP

## 2024-02-20 NOTE — PROGRESS NOTES
"  Physical Therapy  Physical Therapy Orthopedic Treatment    Patient Name: Michael Glover  MRN: [id number]  Today's Date: 2/20/2024  Time Calculation  Start Time: 0745  Stop Time: 0830  Time Calculation (min): 45 min    Insurance:  Visit number: 3 of [place]  Authorization info: Auth needed   Insurance Type: Payor: [employer] MEDICARE / Plan: [employer] MEDICARE ADVANTAGE / Product Type: *No Product type* /      Current Problem  1. Cervical spine pain  Follow Up In Physical Therapy      2. Stiffness of cervical spine  Follow Up In Physical Therapy            Referred by: Dr. Hannah Cast     General:  General  Reason for Referral: Cervical pain and stiffness  Referred By: Hannah Cast MD    Precautions:  Precautions  Precautions Comment: Thyroid disorder, Hx of brain surgery in 1999  Medical History Form: Reviewed (scanned into chart)    Subjective:   Pt has continued to have some discomfort and tightness in her neck that began to get worse last Friday     Objective:  Palpation/ Observation   Pt had slight increase in kyphosis of thoracic spine. Pt had increased cervical paraspinal tightness upon palpation.     Shoulder ROM  WFL in all directions     Cervical ROM   Flexion: 30  Extension:45   R rot: 60  L rot: 70   R side bend: 10  L side bend: 10    Outcome Measures:        Treatments:  Access Code: [email or web address]  URL: [email or web address]  Date: 01/18/2024  Prepared by: Christiano Sam    Manual     There ex  Seated banded ER with scap squeeze 2 x 10   Chin tuck in sitting 2 x 10   Seated AROM rotation 2 x 10     Attended stim with thermaprobe to B UTM x 10 min with 5 bar heating    Mechanical traction 17#/0#  60\"/20\"  x 15 min     EDUCATION: Home exercise program, plan of care, activity modifications, pain management, and injury pathology       Assessment:     Patient tolerated session well today.  Patient had continued relief of neck pain after mechanical traction and thermaprobe were used.  Patient appeared " to have improved active range of motion of cervical rotation today after treatment.  Patient continues to make good progress towards goals established and plan of care and should continue with skilled therapy.    Plan:     Planned Interventions include: therapeutic exercise, self-care home management, manual therapy, therapeutic activities, gait training, neuromuscular coordination, vasopneumatic, dry needling, aquatic therapy  Frequency: 1 x Week  Duration: 8 Weeks  Goals: Set and discussed today  Active       PT Problem       PT Goals       Start:  01/18/24       1. Pt will decrease NDI to 5/50 or better in order to demonstrate a decrease in neck pain   2. Pt will increase cervical rotation, flexion, and extension ROM by a minimum of 5 degrees in order to demonstrate an increase in functional ROM   3. Pt will demonstrate independence and compliance with HEP with no increase in pain in order to demonstrate an increase in functional mobility  4.  Patient will be able to sleep through the night without any increase in neck pain.  5.  Patient will be able to perform all activities without any increase in cervical spine pain.             Plan of care was developed with input and agreement by the patient      Christiano Sam, PT

## 2024-02-27 ENCOUNTER — TREATMENT (OUTPATIENT)
Dept: PHYSICAL THERAPY | Facility: CLINIC | Age: 69
End: 2024-02-27
Payer: MEDICARE

## 2024-02-27 DIAGNOSIS — M43.6 STIFFNESS OF CERVICAL SPINE: ICD-10-CM

## 2024-02-27 DIAGNOSIS — M54.2 CERVICAL SPINE PAIN: ICD-10-CM

## 2024-02-27 PROCEDURE — 97012 MECHANICAL TRACTION THERAPY: CPT | Mod: GP

## 2024-02-27 PROCEDURE — 97140 MANUAL THERAPY 1/> REGIONS: CPT | Mod: GP

## 2024-02-27 PROCEDURE — 97110 THERAPEUTIC EXERCISES: CPT | Mod: GP

## 2024-02-27 NOTE — PROGRESS NOTES
"  Physical Therapy  Physical Therapy Orthopedic Treatment    Patient Name: Michael Glover  MRN: [id number]  Today's Date: 2/27/2024  Time Calculation  Start Time: 0915  Stop Time: 1000  Time Calculation (min): 45 min    Insurance:  Visit [place]: [place]  Authorization info: Auth needed   Insurance Type: Payor: [employer] MEDICARE / Plan: [employer] MEDICARE ADVANTAGE / Product Type: *No Product type* /      Current Problem  1. Cervical spine pain  Follow Up In Physical Therapy      2. Stiffness of cervical spine  Follow Up In Physical Therapy            Referred by: Dr. Hannah Cast     General:  General  Reason for Referral: Cervical pain and stiffness  Referred By: Hannah Cast MD    Precautions:  Precautions  Precautions Comment: Thyroid disorder, Hx of brain surgery in 1999  Medical History Form: Reviewed (scanned into chart)    Subjective:   Pt has not had much pain since last session. Pt will get random moments of discomfort but it never lasts too long.     Objective:  Palpation/ Observation   Pt had slight increase in kyphosis of thoracic spine. Pt had increased cervical paraspinal tightness upon palpation.     Shoulder ROM  WFL in all directions     Cervical ROM   Flexion: 30  Extension:45   R rot: 60  L rot: 70   R side bend: 10  L side bend: 10    Outcome Measures:        Treatments:  Access Code: [email or web address]  URL: [email or web address]  Date: 01/18/2024  Prepared by: Christiano Sam    Manual   Cervical  paraspinal STM   Cervical distraction   Occipital release     There ex  Supine AROM rotation (rotation/flexion) 1 x 20   PROM of cervical spine     Mechanical traction 20#/8#  60\"/20\"  x 15 min     EDUCATION: Home exercise program, plan of care, activity modifications, pain management, and injury pathology       Assessment:     Patient tolerated session well today.  Patient continues to get cervical discomfort relief after mechanical traction was performed along with manual therapy.  Patient was " educated to continue to keep up with strengthening at home and we will progress next more strengthening next week.  Patient continues to make good progress towards goals established and plan of care and should continue with skilled therapy.    Plan:     Planned Interventions include: therapeutic exercise, self-care home management, manual therapy, therapeutic activities, gait training, neuromuscular coordination, vasopneumatic, dry needling, aquatic therapy  Frequency: 1 x Week  Duration: 8 Weeks  Goals: Set and discussed today  Active       PT Problem       PT Goals       Start:  01/18/24       1. Pt will decrease NDI to 5/50 or better in order to demonstrate a decrease in neck pain   2. Pt will increase cervical rotation, flexion, and extension ROM by a minimum of 5 degrees in order to demonstrate an increase in functional ROM   3. Pt will demonstrate independence and compliance with HEP with no increase in pain in order to demonstrate an increase in functional mobility  4.  Patient will be able to sleep through the night without any increase in neck pain.  5.  Patient will be able to perform all activities without any increase in cervical spine pain.             Plan of care was developed with input and agreement by the patient      Christiano Sam, PT

## 2024-03-03 NOTE — PROGRESS NOTES
"  Physical Therapy  Physical Therapy Orthopedic Treatment    Patient Name: Michael Glover  MRN: [id number]  Today's Date: 3/5/2024  Time Calculation  Start Time: 1040  Stop Time: 1130  Time Calculation (min): 50 min    Insurance:  Visit number: 5 of [place]  Authorization info: Auth needed   Insurance Type: Payor: [employer] MEDICARE / Plan: [employer] MEDICARE ADVANTAGE / Product Type: *No Product type* /      Current Problem  1. Stiffness of cervical spine        2. Cervical spine pain              Referred by: Dr. Hannah Cast     General:       Precautions:     Medical History Form: Reviewed (scanned into chart)    Subjective:   Patient reports that she has a little discomfort in the neck on Sat but could be due to trying improve posture.    Objective:  Palpation/ Observation   Pt had slight increase in kyphosis of thoracic spine. Pt had increased cervical paraspinal tightness upon palpation.     Shoulder ROM  WFL in all directions     Cervical ROM   Flexion: 30  Extension:45   R rot: 60  L rot: 70   R side bend: 10  L side bend: 10    Outcome Measures:        Treatments:  Access Code: [email or web address]  URL: [email or web address]  Date: 01/18/2024  Prepared by: Christiano Sam    Manual (15')  Cervical  paraspinal, UTM STM (seated)    Mechanical traction 20#/8#  60\"/20\"  x 15 min     Attended Stim with Thermaprobe x 10 min with 5 bar heating to B UTM    EDUCATION: Home exercise program, plan of care, activity modifications, pain management, and injury pathology       Assessment:     Patient tolerated session well today.  Patient continues to get cervical discomfort relief after mechanical traction was performed along with manual therapy and thermaprobe.    Patient continues to make good progress towards goals established and plan of care and should continue with skilled therapy.    Plan:   DILLAN Agee  Planned Interventions include: therapeutic exercise, self-care home management, manual therapy, therapeutic " activities, gait training, neuromuscular coordination, vasopneumatic, dry needling, aquatic therapy  Frequency: 1 x Week  Duration: 8 Weeks  Goals: Set and discussed today  Active       PT Problem       PT Goals       Start:  01/18/24       1. Pt will decrease NDI to 5/50 or better in order to demonstrate a decrease in neck pain   2. Pt will increase cervical rotation, flexion, and extension ROM by a minimum of 5 degrees in order to demonstrate an increase in functional ROM   3. Pt will demonstrate independence and compliance with HEP with no increase in pain in order to demonstrate an increase in functional mobility  4.  Patient will be able to sleep through the night without any increase in neck pain.  5.  Patient will be able to perform all activities without any increase in cervical spine pain.             Plan of care was developed with input and agreement by the patient      Lucinda Massey PTA

## 2024-03-05 ENCOUNTER — TREATMENT (OUTPATIENT)
Dept: PHYSICAL THERAPY | Facility: CLINIC | Age: 69
End: 2024-03-05
Payer: MEDICARE

## 2024-03-05 DIAGNOSIS — M43.6 STIFFNESS OF CERVICAL SPINE: Primary | ICD-10-CM

## 2024-03-05 DIAGNOSIS — M54.2 CERVICAL SPINE PAIN: ICD-10-CM

## 2024-03-05 PROCEDURE — 97012 MECHANICAL TRACTION THERAPY: CPT | Mod: GP,CQ

## 2024-03-05 PROCEDURE — 97032 APPL MODALITY 1+ESTIM EA 15: CPT | Mod: GP,CQ

## 2024-03-05 PROCEDURE — 97140 MANUAL THERAPY 1/> REGIONS: CPT | Mod: GP,59,CQ

## 2024-03-06 NOTE — PROGRESS NOTES
"  Physical Therapy  Physical Therapy Orthopedic Treatment    Patient Name: Michael Glover  MRN: [id number]  Today's Date: 3/7/2024  Time Calculation  Start Time: 1045  Stop Time: 1128  Time Calculation (min): 43 min    Insurance:  Visit number: 6 of [place]  Authorization info: Auth needed   Insurance Type: Payor: [email or web address] MEDICARE / Plan: [employer] MEDICARE ADVANTAGE / Product Type: *No Product type* /      Current Problem  1. Stiffness of cervical spine        2. Cervical spine pain              Referred by: Dr. Hannah Csat     General:       Precautions:     Medical History Form: Reviewed (scanned into chart)    Subjective:   Patient reports that she doesn't have much pain.  Just has soreness in middle just below neck    Objective:  Palpation/ Observation   Pt had slight increase in kyphosis of thoracic spine. Pt had increased cervical paraspinal tightness upon palpation.     Shoulder ROM  WFL in all directions     Cervical ROM   Flexion: 30  Extension:45   R rot: 60  L rot: 70   R side bend: 10  L side bend: 10    Outcome Measures:        Treatments:  Access Code: [email or web address]  URL: [email or web address]  Date: 01/18/2024  Prepared by: Christiano Sam    Manual (25')  Cervical  paraspinal, UTM STM (seated)  Prone thoracic mobs, STM to thoracic paraspinals    Mechanical traction 20#/8#  60\"/20\"  x 15 min         EDUCATION: Home exercise program, plan of care, activity modifications, pain management, and injury pathology       Assessment:     Patient tolerated session well today.  Patient continues to get cervical discomfort relief after mechanical traction   Also had decreased thoracic discomfort after manual work.    Patient continues to make good progress towards goals established and plan of care and should continue with skilled therapy.    Plan:   DILLAN Agee  Planned Interventions include: therapeutic exercise, self-care home management, manual therapy, therapeutic activities, gait training, neuromuscular " coordination, vasopneumatic, dry needling, aquatic therapy  Frequency: 1 x Week  Duration: 8 Weeks  Goals: Set and discussed today  Active       PT Problem       PT Goals       Start:  01/18/24       1. Pt will decrease NDI to 5/50 or better in order to demonstrate a decrease in neck pain   2. Pt will increase cervical rotation, flexion, and extension ROM by a minimum of 5 degrees in order to demonstrate an increase in functional ROM   3. Pt will demonstrate independence and compliance with HEP with no increase in pain in order to demonstrate an increase in functional mobility  4.  Patient will be able to sleep through the night without any increase in neck pain.  5.  Patient will be able to perform all activities without any increase in cervical spine pain.             Plan of care was developed with input and agreement by the patient      Lucinda Massey PTA

## 2024-03-07 ENCOUNTER — TREATMENT (OUTPATIENT)
Dept: PHYSICAL THERAPY | Facility: CLINIC | Age: 69
End: 2024-03-07
Payer: MEDICARE

## 2024-03-07 DIAGNOSIS — M43.6 STIFFNESS OF CERVICAL SPINE: Primary | ICD-10-CM

## 2024-03-07 DIAGNOSIS — M54.2 CERVICAL SPINE PAIN: ICD-10-CM

## 2024-03-07 PROCEDURE — 97012 MECHANICAL TRACTION THERAPY: CPT | Mod: GP,CQ

## 2024-03-07 PROCEDURE — 97140 MANUAL THERAPY 1/> REGIONS: CPT | Mod: GP,59,CQ

## 2024-03-11 ENCOUNTER — TREATMENT (OUTPATIENT)
Dept: PHYSICAL THERAPY | Facility: CLINIC | Age: 69
End: 2024-03-11
Payer: MEDICARE

## 2024-03-11 DIAGNOSIS — M43.6 STIFFNESS OF CERVICAL SPINE: Primary | ICD-10-CM

## 2024-03-11 DIAGNOSIS — M54.2 CERVICAL SPINE PAIN: ICD-10-CM

## 2024-03-11 PROCEDURE — 97140 MANUAL THERAPY 1/> REGIONS: CPT | Mod: GP

## 2024-03-11 PROCEDURE — 97012 MECHANICAL TRACTION THERAPY: CPT | Mod: GP

## 2024-03-11 NOTE — PROGRESS NOTES
"  Physical Therapy  Physical Therapy Orthopedic Treatment    Patient Name: Michael Glover  MRN: [id number]  Today's Date: 3/11/2024  Time Calculation  Start Time: 1345  Stop Time: 1430  Time Calculation (min): 45 min    Insurance:  Visit number: 7 of [place]  Authorization info: Auth needed   Insurance Type: Payor: [employer] MEDICARE / Plan: [employer] MEDICARE ADVANTAGE / Product Type: *No Product type* /      Current Problem  1. Stiffness of cervical spine        2. Cervical spine pain              Referred by: Dr. Hannah Cast     General:  General  Reason for Referral: Cervical pain and stiffness  Referred By: Hannah Cast MD    Precautions:  Precautions  Precautions Comment: Thyroid disorder, Hx of brain surgery in 1999  Medical History Form: Reviewed (scanned into chart)    Subjective:   Patient continues to have minimal discomfort day to day. Always feels better after her PT sessions     Objective:  Palpation/ Observation   Pt had slight increase in kyphosis of thoracic spine. Pt had increased cervical paraspinal tightness upon palpation.     Shoulder ROM  WFL in all directions     Cervical ROM   Flexion: 30  Extension:45   R rot: 60  L rot: 70   R side bend: 10  L side bend: 10    Outcome Measures:        Treatments:  Access Code: [id number]    Manual (25')  Cervical  paraspinal, UTM STM (seated)  PROM of cervical spine   Cervical lateral mobs     Mechanical traction 20#/8#  60\"/20\"  x 15 min         EDUCATION: Home exercise program, plan of care, activity modifications, pain management, and injury pathology       Assessment:     Patient tolerated session well today.  Patient continued to get complete relief from mechanical traction and manual was performed.  Patient was educated that we will continue to work on progressing strengthening next session.  Patient continues to make excellent progress towards goals established and plan of care and should continue with skilled therapy.  Plan:   DILLAN Agee  Planned Interventions " include: therapeutic exercise, self-care home management, manual therapy, therapeutic activities, gait training, neuromuscular coordination, vasopneumatic, dry needling, aquatic therapy  Frequency: 1 x Week  Duration: 8 Weeks  Goals: Set and discussed today  Active       PT Problem       PT Goals       Start:  01/18/24       1. Pt will decrease NDI to 5/50 or better in order to demonstrate a decrease in neck pain   2. Pt will increase cervical rotation, flexion, and extension ROM by a minimum of 5 degrees in order to demonstrate an increase in functional ROM   3. Pt will demonstrate independence and compliance with HEP with no increase in pain in order to demonstrate an increase in functional mobility  4.  Patient will be able to sleep through the night without any increase in neck pain.  5.  Patient will be able to perform all activities without any increase in cervical spine pain.             Plan of care was developed with input and agreement by the patient      Christiano Sam, PT

## 2024-03-13 ENCOUNTER — TREATMENT (OUTPATIENT)
Dept: PHYSICAL THERAPY | Facility: CLINIC | Age: 69
End: 2024-03-13
Payer: MEDICARE

## 2024-03-13 DIAGNOSIS — M43.6 STIFFNESS OF CERVICAL SPINE: Primary | ICD-10-CM

## 2024-03-13 DIAGNOSIS — M54.2 CERVICAL SPINE PAIN: ICD-10-CM

## 2024-03-13 PROCEDURE — 97110 THERAPEUTIC EXERCISES: CPT | Mod: GP

## 2024-03-13 PROCEDURE — 97012 MECHANICAL TRACTION THERAPY: CPT | Mod: GP

## 2024-03-13 NOTE — PROGRESS NOTES
"  Physical Therapy  Physical Therapy Orthopedic Treatment    Patient Name: Michael Glover  MRN: [id number]  Today's Date: 3/13/2024       Insurance:  Visit [place]: 8 of [place]  Authorization info: Auth needed   Insurance Type: Payor: [employer] MEDICARE / Plan: [facility] MEDICARE ADVANTAGE / Product Type: *No Product type* /      Current Problem  1. Stiffness of cervical spine        2. Cervical spine pain                Referred by: Dr. Hannah Cast     General:  General  Reason for Referral: Cervical pain and stiffness  Referred By: Hannah Cast MD    Precautions:  Precautions  Precautions Comment: Thyroid disorder, Hx of brain surgery in 1999  Medical History Form: Reviewed (scanned into chart)    Subjective:   Patient continues to have minimal neck pain. Pt will only feel some pain in her C7 region from time to time     Objective:  Palpation/ Observation   Pt had slight increase in kyphosis of thoracic spine. Pt had increased cervical paraspinal tightness upon palpation.     Shoulder ROM  WFL in all directions     Cervical ROM   Flexion: 30  Extension:45   R rot: 60  L rot: 70   R side bend: 10  L side bend: 10    Outcome Measures:        Treatments:  Access Code: [id number]    There ex:  AROM cervical rotation/flexion 2 x 10   Seated horizontal abduction (red) 2 x 10   Seated banded ER (red) 2 x 10   Banded row (light) 2 x 10   Standing pec stretch 2 x 30 sec     Mechanical traction 20#/8#  60\"/20\"  x 15 min         EDUCATION: Home exercise program, plan of care, activity modifications, pain management, and injury pathology       Assessment:     Patient tolerated session well today.  Patient was able to continue to work on scapular strengthening today after mechanical traction was performed.  Patient had no increase in cervical spine discomfort when performing strengthening.  Patient continues to make excellent progress towards goals established the plan of care and should continue with skilled therapy.  Plan:   Rows, " NV  Planned Interventions include: therapeutic exercise, self-care home management, manual therapy, therapeutic activities, gait training, neuromuscular coordination, vasopneumatic, dry needling, aquatic therapy  Frequency: 1 x Week  Duration: 8 Weeks  Goals: Set and discussed today  Active       PT Problem       PT Goals       Start:  01/18/24       1. Pt will decrease NDI to 5/50 or better in order to demonstrate a decrease in neck pain   2. Pt will increase cervical rotation, flexion, and extension ROM by a minimum of 5 degrees in order to demonstrate an increase in functional ROM   3. Pt will demonstrate independence and compliance with HEP with no increase in pain in order to demonstrate an increase in functional mobility  4.  Patient will be able to sleep through the night without any increase in neck pain.  5.  Patient will be able to perform all activities without any increase in cervical spine pain.             Plan of care was developed with input and agreement by the patient      Christiano Sam PT

## 2024-03-16 NOTE — PROGRESS NOTES
"  Physical Therapy  Physical Therapy Orthopedic Treatment    Patient Name: Michael Glover  MRN: [id number]  Today's Date: 3/18/2024  Time Calculation  Start Time: 1245  Stop Time: 1330  Time Calculation (min): 45 min    Insurance:  Visit number: 9 of 11  Authorization info: 1/17 - 5/27  Insurance Type: Payor: [employer] MEDICARE / Plan: [employer] MEDICARE ADVANTAGE / Product Type: *No Product type* /      Current Problem  1. Stiffness of cervical spine  Follow Up In Physical Therapy      2. Cervical spine pain  Follow Up In Physical Therapy              Referred by: Dr. Hannah Cast     General:       Precautions:     Medical History Form: Reviewed (scanned into chart)    Subjective:   Patient continues to have minimal neck pain. Had some increased discomfort after doing the exercises.  Jesenia only done them a couple of times since last visit.     Objective:  Palpation/ Observation   Pt had slight increase in kyphosis of thoracic spine. Pt had increased cervical paraspinal tightness upon palpation.     Shoulder ROM  WFL in all directions     Cervical ROM   Flexion: 30  Extension:45   R rot: 60  L rot: 70   R side bend: 10  L side bend: 10    Outcome Measures:        Treatments:  Access Code: [id number]    Mechanical traction 20#/8#  60\"/20\"  x 15 min     Manual:  seated STM/ DTM to B UTM and cervical paraspinals (15')    Attended stim with thermaprobe to B UTM with 5 bar heating x 10 min    EDUCATION: Home exercise program, plan of care, activity modifications, pain management, and injury pathology       Assessment:     Patient tolerated session well today.  Decreased soreness around C7 after treatment.  Discussed cutting back to every other day with strengthening to see if she has decreased soreness after.  Patient continues to make excellent progress towards goals established the plan of care and should continue with skilled therapy.  Plan:    Planned Interventions include: therapeutic exercise, self-care home management, " manual therapy, therapeutic activities, gait training, neuromuscular coordination, vasopneumatic, dry needling, aquatic therapy  Frequency: 1 x Week  Duration: 8 Weeks  Goals: Set and discussed today  Active       PT Problem       PT Goals       Start:  01/18/24       1. Pt will decrease NDI to 5/50 or better in order to demonstrate a decrease in neck pain   2. Pt will increase cervical rotation, flexion, and extension ROM by a minimum of 5 degrees in order to demonstrate an increase in functional ROM   3. Pt will demonstrate independence and compliance with HEP with no increase in pain in order to demonstrate an increase in functional mobility  4.  Patient will be able to sleep through the night without any increase in neck pain.  5.  Patient will be able to perform all activities without any increase in cervical spine pain.             Plan of care was developed with input and agreement by the patient      Lucinda Massey PTA

## 2024-03-18 ENCOUNTER — TREATMENT (OUTPATIENT)
Dept: PHYSICAL THERAPY | Facility: CLINIC | Age: 69
End: 2024-03-18
Payer: MEDICARE

## 2024-03-18 DIAGNOSIS — M43.6 STIFFNESS OF CERVICAL SPINE: Primary | ICD-10-CM

## 2024-03-18 DIAGNOSIS — M54.2 CERVICAL SPINE PAIN: ICD-10-CM

## 2024-03-18 PROCEDURE — 97012 MECHANICAL TRACTION THERAPY: CPT | Mod: GP,CQ

## 2024-03-18 PROCEDURE — 97032 APPL MODALITY 1+ESTIM EA 15: CPT | Mod: GP,CQ

## 2024-03-18 PROCEDURE — 97140 MANUAL THERAPY 1/> REGIONS: CPT | Mod: GP,59,CQ

## 2024-03-19 DIAGNOSIS — G89.29 CHRONIC BILATERAL LOW BACK PAIN WITHOUT SCIATICA: ICD-10-CM

## 2024-03-19 DIAGNOSIS — M54.50 CHRONIC BILATERAL LOW BACK PAIN WITHOUT SCIATICA: ICD-10-CM

## 2024-03-19 RX ORDER — HYDROCODONE BITARTRATE AND ACETAMINOPHEN 7.5; 325 MG/1; MG/1
1 TABLET ORAL EVERY 8 HOURS
Qty: 20 TABLET | Refills: 0 | Status: SHIPPED | OUTPATIENT
Start: 2024-03-19

## 2024-03-20 ENCOUNTER — TREATMENT (OUTPATIENT)
Dept: PHYSICAL THERAPY | Facility: CLINIC | Age: 69
End: 2024-03-20
Payer: MEDICARE

## 2024-03-20 DIAGNOSIS — M54.2 CERVICAL SPINE PAIN: ICD-10-CM

## 2024-03-20 DIAGNOSIS — M43.6 STIFFNESS OF CERVICAL SPINE: ICD-10-CM

## 2024-03-20 PROCEDURE — 97140 MANUAL THERAPY 1/> REGIONS: CPT | Mod: GP

## 2024-03-20 PROCEDURE — 97012 MECHANICAL TRACTION THERAPY: CPT | Mod: GP

## 2024-03-20 NOTE — PROGRESS NOTES
"  Physical Therapy  Physical Therapy Orthopedic Treatment    Patient Name: Michael Glover  MRN: [id number]  Today's Date: 3/20/2024  Time Calculation  Start Time: 1430  Stop Time: 1515  Time Calculation (min): 45 min    Insurance:  Visit number: 10 of 11  Authorization info: 1/17 - 5/27  Insurance Type: Payor: [employer] MEDICARE / Plan: [employer] MEDICARE ADVANTAGE / Product Type: *No Product type* /      Current Problem  1. Cervical spine pain  Follow Up In Physical Therapy      2. Stiffness of cervical spine  Follow Up In Physical Therapy              Referred by: Dr. Hannah Cast     General:  General  Reason for Referral: Cervical pain and stiffness  Referred By: Hannah Cast MD    Precautions:  Precautions  Precautions Comment: Thyroid disorder, Hx of brain surgery in 1999  Medical History Form: Reviewed (scanned into chart)    Subjective:   Patient continues to have minimal neck pain. She feels comfortable with today being her last appt.     Objective:  Palpation/ Observation   Pt had slight increase in kyphosis of thoracic spine. Pt had increased cervical paraspinal tightness upon palpation.     Shoulder ROM  WFL in all directions     Cervical ROM   Flexion: 30  Extension:45   R rot: 60  L rot: 70   R side bend: 10  L side bend: 10    Outcome Measures:  Other Measures  Neck Disability Index: 0/50     Treatments:  Access Code: [id number]    Mechanical traction 20#/8#  60\"/20\"  x 15 min     Manual:  supine STM/ DTM to B UTM and cervical paraspinals   Manual traction    Occipital release       EDUCATION: Home exercise program, plan of care, activity modifications, pain management, and injury pathology       Assessment:     Patient tolerated session well today.  Patient showed that she was able to accomplish all goals established and the plan of care by increase strength goals, increased range of motion, and improved outcome score, and improved function.  Patient was educated to reach out with any questions in the near " future.  Patient has made excellent progress during her episode of care and will be discharged today.      Discharge Summary      Discharge Summary: PT    Discharge Information: Date of discharge 3/20/24, Date of last visit 3/20/24, Date of evaluation 1/17/24, Number of attended visits 10, Referred by Dr. Cast, and Referred for Cervical spine pain    Therapy Summary: Achieved all goals     Discharge Status: Back to [facility]     Rehab Discharge Reason: Achieved all and/or the most significant goals(s)      Plan:    Planned Interventions include: therapeutic exercise, self-care home management, manual therapy, therapeutic activities, gait training, neuromuscular coordination, vasopneumatic, dry needling, aquatic therapy  Frequency: 1 x Week  Duration: 8 Weeks  Goals: Set and discussed today  Active       PT Problem       PT Goals       Start:  01/18/24       1. Pt will decrease NDI to 5/50 or better in order to demonstrate a decrease in neck pain   2. Pt will increase cervical rotation, flexion, and extension ROM by a minimum of 5 degrees in order to demonstrate an increase in functional ROM   3. Pt will demonstrate independence and compliance with HEP with no increase in pain in order to demonstrate an increase in functional mobility  4.  Patient will be able to sleep through the night without any increase in neck pain.  5.  Patient will be able to perform all activities without any increase in cervical spine pain.             Plan of care was developed with input and agreement by the patient      Christiano Sam PT

## 2024-04-10 ENCOUNTER — APPOINTMENT (OUTPATIENT)
Dept: PHYSICAL THERAPY | Facility: CLINIC | Age: 69
End: 2024-04-10
Payer: MEDICARE

## 2024-04-22 ENCOUNTER — OFFICE VISIT (OUTPATIENT)
Dept: PRIMARY CARE | Facility: CLINIC | Age: 69
End: 2024-04-22
Payer: MEDICARE

## 2024-04-22 VITALS
HEART RATE: 82 BPM | HEIGHT: 64 IN | WEIGHT: 167.6 LBS | SYSTOLIC BLOOD PRESSURE: 113 MMHG | DIASTOLIC BLOOD PRESSURE: 63 MMHG | BODY MASS INDEX: 28.61 KG/M2 | TEMPERATURE: 98.1 F | OXYGEN SATURATION: 97 %

## 2024-04-22 DIAGNOSIS — R21 RASH: ICD-10-CM

## 2024-04-22 DIAGNOSIS — G89.29 CHRONIC BILATERAL LOW BACK PAIN WITHOUT SCIATICA: Primary | ICD-10-CM

## 2024-04-22 DIAGNOSIS — M54.50 CHRONIC BILATERAL LOW BACK PAIN WITHOUT SCIATICA: Primary | ICD-10-CM

## 2024-04-22 DIAGNOSIS — R05.2 SUBACUTE COUGH: ICD-10-CM

## 2024-04-22 DIAGNOSIS — M25.561 CHRONIC PAIN OF BOTH KNEES: ICD-10-CM

## 2024-04-22 DIAGNOSIS — G89.29 CHRONIC PAIN OF BOTH KNEES: ICD-10-CM

## 2024-04-22 DIAGNOSIS — M25.562 CHRONIC PAIN OF BOTH KNEES: ICD-10-CM

## 2024-04-22 DIAGNOSIS — K21.9 GASTROESOPHAGEAL REFLUX DISEASE WITHOUT ESOPHAGITIS: ICD-10-CM

## 2024-04-22 LAB
AMPHETAMINES UR QL SCN: NORMAL
BARBITURATES UR QL SCN: NORMAL
BZE UR QL SCN: NORMAL
CANNABINOIDS UR QL SCN: NORMAL
CREAT UR-MCNC: 122.2 MG/DL (ref 20–320)
PCP UR QL SCN: NORMAL

## 2024-04-22 PROCEDURE — 80368 SEDATIVE HYPNOTICS: CPT

## 2024-04-22 PROCEDURE — 1159F MED LIST DOCD IN RCRD: CPT | Performed by: FAMILY MEDICINE

## 2024-04-22 PROCEDURE — 82570 ASSAY OF URINE CREATININE: CPT

## 2024-04-22 PROCEDURE — 1160F RVW MEDS BY RX/DR IN RCRD: CPT | Performed by: FAMILY MEDICINE

## 2024-04-22 PROCEDURE — 1123F ACP DISCUSS/DSCN MKR DOCD: CPT | Performed by: FAMILY MEDICINE

## 2024-04-22 PROCEDURE — 99214 OFFICE O/P EST MOD 30 MIN: CPT | Performed by: FAMILY MEDICINE

## 2024-04-22 PROCEDURE — 80346 BENZODIAZEPINES1-12: CPT

## 2024-04-22 PROCEDURE — 80361 OPIATES 1 OR MORE: CPT

## 2024-04-22 PROCEDURE — 80307 DRUG TEST PRSMV CHEM ANLYZR: CPT

## 2024-04-22 PROCEDURE — 80358 DRUG SCREENING METHADONE: CPT

## 2024-04-22 PROCEDURE — 80373 DRUG SCREENING TRAMADOL: CPT

## 2024-04-22 PROCEDURE — 1158F ADVNC CARE PLAN TLK DOCD: CPT | Performed by: FAMILY MEDICINE

## 2024-04-22 PROCEDURE — 3008F BODY MASS INDEX DOCD: CPT | Performed by: FAMILY MEDICINE

## 2024-04-22 PROCEDURE — 80354 DRUG SCREENING FENTANYL: CPT

## 2024-04-22 PROCEDURE — 80365 DRUG SCREENING OXYCODONE: CPT

## 2024-04-22 RX ORDER — PANTOPRAZOLE SODIUM 40 MG/1
40 TABLET, DELAYED RELEASE ORAL 2 TIMES DAILY
Qty: 60 TABLET | Refills: 1 | Status: SHIPPED | OUTPATIENT
Start: 2024-04-22 | End: 2024-05-15

## 2024-04-22 RX ORDER — CLOBETASOL PROPIONATE 0.5 MG/G
OINTMENT TOPICAL
OUTPATIENT
Start: 2024-04-22

## 2024-04-22 RX ORDER — DESONIDE 0.5 MG/G
CREAM TOPICAL 2 TIMES DAILY PRN
Qty: 60 G | Refills: 0 | Status: SHIPPED | OUTPATIENT
Start: 2024-04-22

## 2024-04-22 RX ORDER — DESONIDE 0.5 MG/G
CREAM TOPICAL 2 TIMES DAILY PRN
Qty: 60 G | Refills: 0 | Status: SHIPPED | OUTPATIENT
Start: 2024-04-22 | End: 2024-04-22

## 2024-04-22 ASSESSMENT — ENCOUNTER SYMPTOMS
OCCASIONAL FEELINGS OF UNSTEADINESS: 0
LOSS OF SENSATION IN FEET: 0
DEPRESSION: 0

## 2024-04-22 ASSESSMENT — ANXIETY QUESTIONNAIRES
4. TROUBLE RELAXING: NOT AT ALL
7. FEELING AFRAID AS IF SOMETHING AWFUL MIGHT HAPPEN: NOT AT ALL
1. FEELING NERVOUS, ANXIOUS, OR ON EDGE: NOT AT ALL
2. NOT BEING ABLE TO STOP OR CONTROL WORRYING: NOT AT ALL
6. BECOMING EASILY ANNOYED OR IRRITABLE: NOT AT ALL
3. WORRYING TOO MUCH ABOUT DIFFERENT THINGS: NOT AT ALL
5. BEING SO RESTLESS THAT IT IS HARD TO SIT STILL: NOT AT ALL
GAD7 TOTAL SCORE: 0

## 2024-04-22 NOTE — PROGRESS NOTES
Subjective   Patient ID: Michael Glover is a 68 y.o. female who presents for Med Management and Cough.  Has had cough for 2 weeks, comes and goes, is dry.  Doesn't feel like the cough is coming from her chest, feels like it is in  upper airway.  Has acid in throat at times  Prilosec helps.  Right nostril with pain inside, comes and goes.  Can be a little blood in it on tissue.    Pain medication is for neck and back, when it flares.  Did finish 11 days of therapy for neck and is doing better.  Back with low grade pain constantly, but will flare up randomly.  When  it gets  bad, vicodin helps.    Has a skin lesion on  her right upper shoulder she'd like checked.    Also notes getting itchy patches  again, had cream she'd like refilled.    OARRS:  Hannah Cast MD on 4/22/2024  8:09 AM  I have personally reviewed the OARRS report for Michael Glover. I have considered the risks of abuse, dependence, addiction and diversion and I believe that it is clinically appropriate for Michael Glover to be prescribed this medication    Is the patient prescribed a combination of a benzodiazepine and opioid?  No    Last Urine Drug Screen / ordered today: Yes  No results found for this or any previous visit (from past 8760 hour(s)).  N/A    Controlled Substance Agreement:  Date of the Last Agreement: 4/22/24  Reviewed Controlled Substance Agreement including but not limited to the benefits, risks, and alternatives to treatment with a Controlled Substance medication(s).    Opioids:  What is the patient's goal of therapy? Help manage pain when bad  Is this being achieved with current treatment? yes    I have calculated the patient's Morphine Dose Equivalent (MED):   I have considered referral to Pain Management and/or a specialist, and do not feel it is necessary at this time.    I feel that it is clinically indicated to continue this current medication regimen after consideration of alternative therapies, and other non-opioid  "treatment.    Opioid Risk Screening:  No data recorded    Pain Assessment:  No data recorded        Review of Systems    Objective   /63   Pulse 82   Temp 36.7 °C (98.1 °F)   Ht 1.626 m (5' 4\")   Wt 76 kg (167 lb 9.6 oz)   SpO2 97%   BMI 28.77 kg/m²     Physical Exam  Vitals reviewed.   Constitutional:       Appearance: Normal appearance.   Cardiovascular:      Rate and Rhythm: Normal rate and regular rhythm.      Heart sounds: No murmur heard.  Pulmonary:      Effort: Pulmonary effort is normal.      Breath sounds: Normal breath sounds.   Musculoskeletal:      Right lower leg: No edema.      Left lower leg: No edema.   Lymphadenopathy:      Cervical: No cervical adenopathy.   Skin:     Comments: Right  upper back with black head.  No evidence of infection.   Neurological:      Mental Status: She is alert.   Psychiatric:         Mood and Affect: Mood normal.         Behavior: Behavior normal.           Assessment/Plan   Problem List Items Addressed This Visit       Chronic bilateral low back pain without sciatica - Primary    Relevant Orders    Opiate/Opioid/Benzo Prescription Compliance    OOB Internal Tracking    GERD (gastroesophageal reflux disease)    Relevant Medications    pantoprazole (ProtoNix) 40 mg EC tablet     Other Visit Diagnoses       Chronic pain of both knees        Relevant Orders    Opiate/Opioid/Benzo Prescription Compliance    OOB Internal Tracking    Rash        BMI 28.0-28.9,adult        Subacute cough                   "

## 2024-04-23 NOTE — PATIENT INSTRUCTIONS
Chronic back pain, worse at times.  Using vicodin when bad.  Urine Drug Screen and Controlled Substance Agreement done today.  Follow up in 3 months, or when refill is needed, if longer than 3 months.    Ongoing dry cough may be from acid reflux  Start protonix 40 mg daily.  If cough persists, will need to evaluate further.    Desonide refilled for recurrent rash.

## 2024-05-07 ENCOUNTER — HOSPITAL ENCOUNTER (OUTPATIENT)
Dept: RADIOLOGY | Facility: CLINIC | Age: 69
Discharge: HOME | End: 2024-05-07
Payer: MEDICARE

## 2024-05-07 ENCOUNTER — TELEPHONE (OUTPATIENT)
Dept: PRIMARY CARE | Facility: CLINIC | Age: 69
End: 2024-05-07

## 2024-05-07 ENCOUNTER — OFFICE VISIT (OUTPATIENT)
Dept: PRIMARY CARE | Facility: CLINIC | Age: 69
End: 2024-05-07
Payer: MEDICARE

## 2024-05-07 VITALS
WEIGHT: 165.8 LBS | HEIGHT: 64 IN | DIASTOLIC BLOOD PRESSURE: 70 MMHG | OXYGEN SATURATION: 95 % | BODY MASS INDEX: 28.31 KG/M2 | TEMPERATURE: 98.1 F | SYSTOLIC BLOOD PRESSURE: 114 MMHG | HEART RATE: 85 BPM

## 2024-05-07 DIAGNOSIS — R10.31 GROIN PAIN, RIGHT: ICD-10-CM

## 2024-05-07 DIAGNOSIS — R10.31 GROIN PAIN, RIGHT: Primary | ICD-10-CM

## 2024-05-07 DIAGNOSIS — S80.11XA CONTUSION OF RIGHT LOWER LEG, INITIAL ENCOUNTER: ICD-10-CM

## 2024-05-07 DIAGNOSIS — R05.2 SUBACUTE COUGH: ICD-10-CM

## 2024-05-07 PROCEDURE — 1159F MED LIST DOCD IN RCRD: CPT | Performed by: FAMILY MEDICINE

## 2024-05-07 PROCEDURE — 73502 X-RAY EXAM HIP UNI 2-3 VIEWS: CPT | Mod: RT

## 2024-05-07 PROCEDURE — 1123F ACP DISCUSS/DSCN MKR DOCD: CPT | Performed by: FAMILY MEDICINE

## 2024-05-07 PROCEDURE — 3008F BODY MASS INDEX DOCD: CPT | Performed by: FAMILY MEDICINE

## 2024-05-07 PROCEDURE — 73502 X-RAY EXAM HIP UNI 2-3 VIEWS: CPT | Mod: RIGHT SIDE | Performed by: RADIOLOGY

## 2024-05-07 PROCEDURE — 99213 OFFICE O/P EST LOW 20 MIN: CPT | Performed by: FAMILY MEDICINE

## 2024-05-07 PROCEDURE — 1160F RVW MEDS BY RX/DR IN RCRD: CPT | Performed by: FAMILY MEDICINE

## 2024-05-07 RX ORDER — AZITHROMYCIN 250 MG/1
TABLET, FILM COATED ORAL DAILY
Qty: 6 TABLET | Refills: 0 | Status: SHIPPED | OUTPATIENT
Start: 2024-05-07 | End: 2024-05-11

## 2024-05-07 ASSESSMENT — ENCOUNTER SYMPTOMS
DEPRESSION: 0
OCCASIONAL FEELINGS OF UNSTEADINESS: 0
LOSS OF SENSATION IN FEET: 0

## 2024-05-07 ASSESSMENT — ANXIETY QUESTIONNAIRES
2. NOT BEING ABLE TO STOP OR CONTROL WORRYING: NOT AT ALL
GAD7 TOTAL SCORE: 0
3. WORRYING TOO MUCH ABOUT DIFFERENT THINGS: NOT AT ALL
5. BEING SO RESTLESS THAT IT IS HARD TO SIT STILL: NOT AT ALL
6. BECOMING EASILY ANNOYED OR IRRITABLE: NOT AT ALL
4. TROUBLE RELAXING: NOT AT ALL
7. FEELING AFRAID AS IF SOMETHING AWFUL MIGHT HAPPEN: NOT AT ALL
1. FEELING NERVOUS, ANXIOUS, OR ON EDGE: NOT AT ALL

## 2024-05-07 NOTE — PROGRESS NOTES
"Subjective   Patient ID: Michael Glover is a 68 y.o. female who presents for Leg Pain.  Patient  comes in today with complaints of right upper leg/groin pain for several months.  Notes that it hurts to walk, and affects her gait.  Has to swing leg around to the side when walking, to reduce the pain in groin.  Tylenol helps.  The groin pain doesn't wake her at night, but gets leg cramps that do.  No acute injury recalled.    Also has noticed right lower leg bruising,  about 3 days ago.  Has several bruises, mainly on the lateral aspect below the knee.  Not other areas with bruising.  Does bruise easily, but this is different, since there are several in the same area.  No trauma.  Sore to touch the bruises.  No  swelling of leg.  No calf pain.    Cough has persisted  Is using protonix daily since last visit on 4/22/24.  No significant sinus congestion or drainage.  Cough feels like a tickle in her throat. Will have to take a drink to settle it down.  No postnasal drip  No chest congestion.  No mucous production or wheezing.      Review of Systems    Objective   /70   Pulse 85   Temp 36.7 °C (98.1 °F)   Ht 1.626 m (5' 4\")   Wt 75.2 kg (165 lb 12.8 oz)   SpO2 95%   BMI 28.46 kg/m²     Physical Exam  Vitals reviewed.   Constitutional:       Appearance: Normal appearance.   Cardiovascular:      Rate and Rhythm: Normal rate and regular rhythm.      Heart sounds: No murmur heard.  Pulmonary:      Effort: Pulmonary effort is normal.      Breath sounds: Normal breath sounds.   Musculoskeletal:      Right hip: Tenderness (right groin) present. Decreased range of motion. Normal strength.      Right lower leg: No edema.      Left lower leg: No edema.      Comments: Right lower leg, below knee, with several discrete bruises, mostly on lateral aspect of leg.  No swelling.  No calf tenderness.   Neurological:      Mental Status: She is alert.           Assessment/Plan   Problem List Items Addressed This Visit  "   None  Visit Diagnoses       Groin pain, right    -  Primary    Relevant Orders    XR hip right with pelvis when performed 2 or 3 views    Subacute cough        Relevant Medications    azithromycin (Zithromax) 250 mg tablet    Contusion of right lower leg, initial encounter

## 2024-05-07 NOTE — TELEPHONE ENCOUNTER
Patient is calling regarding right leg pain and bruising. Wants to know should she come in or can you place an order for her to have a x-ray or something.

## 2024-05-08 NOTE — PATIENT INSTRUCTIONS
Right groin pain, without trauma.  Check X-Ray to evaluate hip.  Depending on results, may benefit from  PT vs ortho.    Bruising, isolated to right lower leg.  No evidence of blood clot.  For now, monitor.  If additional symptoms develop, especially leg swelling, calf pain or increased bruising in other areas, will need to evaluate  further.    Ongoing cough, not improving with daily acid blocking medication (Protonix).  Continue Protonix for now.  Will empirically try antibiotic  If not improving, will check CXR.

## 2024-05-15 DIAGNOSIS — K21.9 GASTROESOPHAGEAL REFLUX DISEASE WITHOUT ESOPHAGITIS: ICD-10-CM

## 2024-05-15 RX ORDER — PANTOPRAZOLE SODIUM 40 MG/1
40 TABLET, DELAYED RELEASE ORAL 2 TIMES DAILY
Qty: 180 TABLET | Refills: 1 | Status: SHIPPED | OUTPATIENT
Start: 2024-05-15 | End: 2024-11-11

## 2024-06-18 ENCOUNTER — APPOINTMENT (OUTPATIENT)
Dept: ORTHOPEDIC SURGERY | Facility: CLINIC | Age: 69
End: 2024-06-18
Payer: MEDICARE

## 2024-06-18 DIAGNOSIS — M54.50 CHRONIC BILATERAL LOW BACK PAIN WITHOUT SCIATICA: ICD-10-CM

## 2024-06-18 DIAGNOSIS — G89.29 CHRONIC BILATERAL LOW BACK PAIN WITHOUT SCIATICA: ICD-10-CM

## 2024-06-18 RX ORDER — OMEPRAZOLE 40 MG/1
40 CAPSULE, DELAYED RELEASE ORAL DAILY
Qty: 90 CAPSULE | Refills: 1 | OUTPATIENT
Start: 2024-06-18

## 2024-06-18 NOTE — TELEPHONE ENCOUNTER
Chart reviewed.  Patient hasn't been seen in over one year.  Her PCP has her on Protonix.    Prescription refill denied.

## 2024-06-19 RX ORDER — HYDROCODONE BITARTRATE AND ACETAMINOPHEN 7.5; 325 MG/1; MG/1
1 TABLET ORAL EVERY 8 HOURS
Qty: 20 TABLET | Refills: 0 | Status: SHIPPED | OUTPATIENT
Start: 2024-06-19

## 2024-07-15 ENCOUNTER — PATIENT OUTREACH (OUTPATIENT)
Dept: PRIMARY CARE | Facility: CLINIC | Age: 69
End: 2024-07-15
Payer: MEDICARE

## 2024-07-15 ENCOUNTER — DOCUMENTATION (OUTPATIENT)
Dept: PRIMARY CARE | Facility: CLINIC | Age: 69
End: 2024-07-15
Payer: MEDICARE

## 2024-07-15 NOTE — PROGRESS NOTES
Outreach made for discharge follow up. Per patient, she is feeling much better, just a little fatigued. Patient verbalized understanding of discharge instructions. Declined to schedule a hospital follow up at this time. Stated she will call the office if she starts feeling worse instead of better.     Discharge Facility: [facility]   Discharge Diagnosis: Covid-19  Admission Date: 7/10/2024  Discharge Date: 7/13/2024    PCP Appointment Date: Declined to schedule at this time  Specialist Appointment Date: No referrals made  Hospital Encounter and Summary Linked: Yes    68yoF with PMHx of pituitary adenoma s/p resection complicated by panhypopituitarism who presented to the hospital with COVID-19 infection. Due to history of panhypopituitarism, patient was started on stress dose steroids with hydrocortisone. Also treated with Remdesivir. Patient discharged to home self care and instructed to resume regular hydrocortisone dose. Also advised to double hydorocortison dose and call endocrinologist if she begins to feel weaker at home.

## 2024-07-25 ENCOUNTER — TELEPHONE (OUTPATIENT)
Dept: PRIMARY CARE | Facility: CLINIC | Age: 69
End: 2024-07-25
Payer: MEDICARE

## 2024-07-25 DIAGNOSIS — Z12.31 BREAST CANCER SCREENING BY MAMMOGRAM: Primary | ICD-10-CM

## 2024-07-30 ENCOUNTER — TELEPHONE (OUTPATIENT)
Dept: PRIMARY CARE | Facility: CLINIC | Age: 69
End: 2024-07-30
Payer: MEDICARE

## 2024-07-30 DIAGNOSIS — R05.1 ACUTE COUGH: Primary | ICD-10-CM

## 2024-07-30 RX ORDER — BENZONATATE 200 MG/1
200 CAPSULE ORAL 3 TIMES DAILY PRN
Qty: 30 CAPSULE | Refills: 0 | Status: SHIPPED | OUTPATIENT
Start: 2024-07-30 | End: 2024-08-29

## 2024-08-09 ENCOUNTER — HOSPITAL ENCOUNTER (OUTPATIENT)
Dept: RADIOLOGY | Facility: CLINIC | Age: 69
Discharge: HOME | End: 2024-08-09
Payer: MEDICARE

## 2024-08-09 VITALS — BODY MASS INDEX: 28.34 KG/M2 | HEIGHT: 62 IN | WEIGHT: 154 LBS

## 2024-08-09 DIAGNOSIS — Z12.31 BREAST CANCER SCREENING BY MAMMOGRAM: ICD-10-CM

## 2024-08-09 PROCEDURE — 77067 SCR MAMMO BI INCL CAD: CPT

## 2024-08-12 ENCOUNTER — APPOINTMENT (OUTPATIENT)
Dept: PRIMARY CARE | Facility: CLINIC | Age: 69
End: 2024-08-12
Payer: MEDICARE

## 2024-08-12 VITALS
OXYGEN SATURATION: 98 % | HEART RATE: 68 BPM | WEIGHT: 157.8 LBS | HEIGHT: 62 IN | TEMPERATURE: 98.3 F | BODY MASS INDEX: 29.04 KG/M2 | DIASTOLIC BLOOD PRESSURE: 70 MMHG | SYSTOLIC BLOOD PRESSURE: 116 MMHG

## 2024-08-12 DIAGNOSIS — R05.3 POST-COVID CHRONIC COUGH: Primary | ICD-10-CM

## 2024-08-12 DIAGNOSIS — U09.9 POST-COVID CHRONIC COUGH: Primary | ICD-10-CM

## 2024-08-12 DIAGNOSIS — K21.9 GASTROESOPHAGEAL REFLUX DISEASE WITHOUT ESOPHAGITIS: ICD-10-CM

## 2024-08-12 PROCEDURE — 1159F MED LIST DOCD IN RCRD: CPT | Performed by: FAMILY MEDICINE

## 2024-08-12 PROCEDURE — 99213 OFFICE O/P EST LOW 20 MIN: CPT | Performed by: FAMILY MEDICINE

## 2024-08-12 PROCEDURE — 1123F ACP DISCUSS/DSCN MKR DOCD: CPT | Performed by: FAMILY MEDICINE

## 2024-08-12 PROCEDURE — 3008F BODY MASS INDEX DOCD: CPT | Performed by: FAMILY MEDICINE

## 2024-08-12 PROCEDURE — 1160F RVW MEDS BY RX/DR IN RCRD: CPT | Performed by: FAMILY MEDICINE

## 2024-08-12 RX ORDER — OMEPRAZOLE 20 MG/1
20 TABLET, DELAYED RELEASE ORAL
Qty: 90 TABLET | COMMUNITY
Start: 2024-08-12 | End: 2024-11-10

## 2024-08-12 RX ORDER — PREDNISONE 20 MG/1
40 TABLET ORAL DAILY
Qty: 10 TABLET | Refills: 0 | Status: SHIPPED | OUTPATIENT
Start: 2024-08-12 | End: 2024-08-17

## 2024-08-12 RX ORDER — BENZONATATE 200 MG/1
200 CAPSULE ORAL 3 TIMES DAILY PRN
Qty: 30 CAPSULE | Refills: 0 | Status: SHIPPED | OUTPATIENT
Start: 2024-08-12

## 2024-08-12 ASSESSMENT — ENCOUNTER SYMPTOMS
LOSS OF SENSATION IN FEET: 0
OCCASIONAL FEELINGS OF UNSTEADINESS: 0
DEPRESSION: 0

## 2024-08-12 ASSESSMENT — ANXIETY QUESTIONNAIRES
3. WORRYING TOO MUCH ABOUT DIFFERENT THINGS: NOT AT ALL
7. FEELING AFRAID AS IF SOMETHING AWFUL MIGHT HAPPEN: NOT AT ALL
4. TROUBLE RELAXING: NOT AT ALL
6. BECOMING EASILY ANNOYED OR IRRITABLE: NOT AT ALL
5. BEING SO RESTLESS THAT IT IS HARD TO SIT STILL: NOT AT ALL
1. FEELING NERVOUS, ANXIOUS, OR ON EDGE: NOT AT ALL

## 2024-08-12 ASSESSMENT — PATIENT HEALTH QUESTIONNAIRE - PHQ9
1. LITTLE INTEREST OR PLEASURE IN DOING THINGS: NOT AT ALL
SUM OF ALL RESPONSES TO PHQ9 QUESTIONS 1 AND 2: 0
2. FEELING DOWN, DEPRESSED OR HOPELESS: NOT AT ALL

## 2024-08-12 NOTE — PROGRESS NOTES
"Subjective   Patient ID: Michael Glover is a 68 y.o. female who presents for Sore Throat and Cough.  Had Covid over a month ago.  Was in hospital for 3 days.  Has been doing better   No longer tired, no fever.  However, dry cough has persisted.  Not much wheezing.  Benzonatate does help some some, but not resolving it  Sometimes will wake with cough.  Throat has been sore as well, lower than back of mouth.    Using prilosec now for GERD, which helps, but hasn't resolved cough.        Review of Systems    Objective   /70   Pulse 68   Temp 36.8 °C (98.3 °F)   Ht 1.575 m (5' 2\")   Wt 71.6 kg (157 lb 12.8 oz)   SpO2 98%   BMI 28.86 kg/m²     Physical Exam  Vitals reviewed.   Constitutional:       Appearance: Normal appearance.   HENT:      Mouth/Throat:      Pharynx: Oropharynx is clear. Posterior oropharyngeal erythema (minimal) present. No oropharyngeal exudate.   Cardiovascular:      Rate and Rhythm: Normal rate and regular rhythm.      Heart sounds: No murmur heard.  Pulmonary:      Effort: Pulmonary effort is normal.      Breath sounds: Normal breath sounds.   Musculoskeletal:      Right lower leg: No edema.      Left lower leg: No edema.   Lymphadenopathy:      Cervical: No cervical adenopathy.   Neurological:      Mental Status: She is alert.           Assessment/Plan   Problem List Items Addressed This Visit       GERD (gastroesophageal reflux disease)    Relevant Medications    omeprazole OTC (PriLOSEC OTC) 20 mg EC tablet     Other Visit Diagnoses       Post-COVID chronic cough    -  Primary    Relevant Medications    benzonatate (Tessalon) 200 mg capsule    predniSONE (Deltasone) 20 mg tablet               "

## 2024-08-13 NOTE — PATIENT INSTRUCTIONS
Admitted for 3 days on July 10 for covid infection.  Symptoms resolved except for dry cough.  Can continue benzonatate as needed for symptom control.  Will also try 5 days of prednisone to help reduce inflammation in lungs that can lead to the cough.  Follow up if not improving.    For GERD, refill prilosec.

## 2024-08-19 ENCOUNTER — TELEPHONE (OUTPATIENT)
Dept: PRIMARY CARE | Facility: CLINIC | Age: 69
End: 2024-08-19
Payer: MEDICARE

## 2024-08-19 DIAGNOSIS — R35.0 URINARY FREQUENCY: Primary | ICD-10-CM

## 2024-08-19 NOTE — TELEPHONE ENCOUNTER
Patient C/O urinary urgency, frequency and burning for past two days     Michael wants order for urine culture, and would like to be notified once order has been placed

## 2024-08-20 ENCOUNTER — LAB (OUTPATIENT)
Dept: LAB | Facility: LAB | Age: 69
End: 2024-08-20
Payer: MEDICARE

## 2024-08-20 DIAGNOSIS — R35.0 URINARY FREQUENCY: ICD-10-CM

## 2024-08-20 LAB
APPEARANCE UR: ABNORMAL
BACTERIA #/AREA URNS AUTO: ABNORMAL /HPF
BILIRUB UR STRIP.AUTO-MCNC: NEGATIVE MG/DL
COLOR UR: ABNORMAL
GLUCOSE UR STRIP.AUTO-MCNC: NORMAL MG/DL
KETONES UR STRIP.AUTO-MCNC: NEGATIVE MG/DL
LEUKOCYTE ESTERASE UR QL STRIP.AUTO: ABNORMAL
MUCOUS THREADS #/AREA URNS AUTO: ABNORMAL /LPF
NITRITE UR QL STRIP.AUTO: NEGATIVE
PH UR STRIP.AUTO: 5.5 [PH]
PROT UR STRIP.AUTO-MCNC: NEGATIVE MG/DL
RBC # UR STRIP.AUTO: ABNORMAL /UL
RBC #/AREA URNS AUTO: ABNORMAL /HPF
SP GR UR STRIP.AUTO: 1.01
UROBILINOGEN UR STRIP.AUTO-MCNC: NORMAL MG/DL
WBC #/AREA URNS AUTO: >50 /HPF
WBC CLUMPS #/AREA URNS AUTO: ABNORMAL /HPF

## 2024-08-20 PROCEDURE — 87086 URINE CULTURE/COLONY COUNT: CPT

## 2024-08-20 PROCEDURE — 81001 URINALYSIS AUTO W/SCOPE: CPT

## 2024-08-20 PROCEDURE — 87186 SC STD MICRODIL/AGAR DIL: CPT

## 2024-08-21 DIAGNOSIS — N30.00 ACUTE CYSTITIS WITHOUT HEMATURIA: Primary | ICD-10-CM

## 2024-08-21 RX ORDER — NITROFURANTOIN 25; 75 MG/1; MG/1
100 CAPSULE ORAL 2 TIMES DAILY
Qty: 14 CAPSULE | Refills: 0 | Status: SHIPPED | OUTPATIENT
Start: 2024-08-21 | End: 2024-08-28

## 2024-08-22 LAB — BACTERIA UR CULT: ABNORMAL

## 2024-09-10 ENCOUNTER — TELEPHONE (OUTPATIENT)
Dept: PRIMARY CARE | Facility: CLINIC | Age: 69
End: 2024-09-10
Payer: MEDICARE

## 2024-09-10 DIAGNOSIS — R30.0 DYSURIA: Primary | ICD-10-CM

## 2024-09-10 NOTE — TELEPHONE ENCOUNTER
Streated for UTI she is till experiencing dysuria, burning, she has completed antibiotics   Requesting a referral to urologist and have her kidneys tested

## 2024-09-12 ENCOUNTER — OFFICE VISIT (OUTPATIENT)
Dept: UROLOGY | Facility: CLINIC | Age: 69
End: 2024-09-12
Payer: MEDICARE

## 2024-09-12 VITALS
DIASTOLIC BLOOD PRESSURE: 84 MMHG | HEART RATE: 76 BPM | SYSTOLIC BLOOD PRESSURE: 129 MMHG | BODY MASS INDEX: 28.48 KG/M2 | WEIGHT: 155.7 LBS

## 2024-09-12 DIAGNOSIS — N39.0 ACUTE UTI: Primary | ICD-10-CM

## 2024-09-12 DIAGNOSIS — R39.9 UTI SYMPTOMS: ICD-10-CM

## 2024-09-12 DIAGNOSIS — N95.2 VAGINAL ATROPHY: ICD-10-CM

## 2024-09-12 DIAGNOSIS — R30.0 DYSURIA: ICD-10-CM

## 2024-09-12 LAB
POC APPEARANCE, URINE: ABNORMAL
POC BILIRUBIN, URINE: NEGATIVE
POC BLOOD, URINE: ABNORMAL
POC COLOR, URINE: YELLOW
POC GLUCOSE, URINE: NEGATIVE MG/DL
POC KETONES, URINE: NEGATIVE MG/DL
POC LEUKOCYTES, URINE: ABNORMAL
POC NITRITE,URINE: NEGATIVE
POC PH, URINE: 6 PH
POC PROTEIN, URINE: NEGATIVE MG/DL
POC SPECIFIC GRAVITY, URINE: 1.02
POC UROBILINOGEN, URINE: 0.2 EU/DL

## 2024-09-12 PROCEDURE — 81001 URINALYSIS AUTO W/SCOPE: CPT | Performed by: OBSTETRICS & GYNECOLOGY

## 2024-09-12 PROCEDURE — 99213 OFFICE O/P EST LOW 20 MIN: CPT | Performed by: OBSTETRICS & GYNECOLOGY

## 2024-09-12 PROCEDURE — 81003 URINALYSIS AUTO W/O SCOPE: CPT | Performed by: OBSTETRICS & GYNECOLOGY

## 2024-09-12 PROCEDURE — 87086 URINE CULTURE/COLONY COUNT: CPT | Performed by: OBSTETRICS & GYNECOLOGY

## 2024-09-12 PROCEDURE — 1160F RVW MEDS BY RX/DR IN RCRD: CPT | Performed by: OBSTETRICS & GYNECOLOGY

## 2024-09-12 PROCEDURE — 1036F TOBACCO NON-USER: CPT | Performed by: OBSTETRICS & GYNECOLOGY

## 2024-09-12 PROCEDURE — 1123F ACP DISCUSS/DSCN MKR DOCD: CPT | Performed by: OBSTETRICS & GYNECOLOGY

## 2024-09-12 PROCEDURE — 1159F MED LIST DOCD IN RCRD: CPT | Performed by: OBSTETRICS & GYNECOLOGY

## 2024-09-12 RX ORDER — PHENAZOPYRIDINE HYDROCHLORIDE 100 MG/1
100 TABLET, FILM COATED ORAL 3 TIMES DAILY PRN
Qty: 20 TABLET | Refills: 0 | Status: SHIPPED | OUTPATIENT
Start: 2024-09-12

## 2024-09-12 RX ORDER — CIPROFLOXACIN 500 MG/1
500 TABLET ORAL 2 TIMES DAILY
Qty: 14 TABLET | Refills: 0 | Status: SHIPPED | OUTPATIENT
Start: 2024-09-12 | End: 2024-09-19

## 2024-09-12 NOTE — PROGRESS NOTES
Subjective   Patient ID: Michael Glover is a 69 y.o. female who presents for Dysuria, urinary urgency, frequency  [facility]  69 y.o.  patient presenting as a referral from Dr. Hannah Cast  with complaints of Dysuria, urinary urgency, frequency    The patient presents with complaints of dysuria and burning outside of voiding for the past 2 weeks. She had a UTI 8/20/2024, culture grew E coli and she was treated with Macrobid. However she symptoms did not resolve and continues to note dysuria. She also notes urinary urgency and frequency in the past 2 weeks. She notes episodes of nocturia but denies any enuresis. She voids every 20 minutes during the day. She denies leaking on laughing, cough or sneezing.She denies any history of nephrolithiasis, gross hematuria or chronic recurrent UTIs.    She has a h/o of pituitary surgery. Followed by COVID infection. She started on 5 days of prednisone to help reduce inflammation in lungs that can lead to the cough.     She is sexually active but denies any vaginal complaints, no abnormal vaginal bleeding or discharge. She denies any vaginal dryness or irritation. She denies any bulge complaints, no pressure or pulling.    She denies any bowel related complaints, no fecal or flatal incontinence.    She has no other complaints.        Review of Systems  Constitutional: No fever, No chills and No fatigue.   Eyes: No vision problems and No dryness of the eyes.   ENT: No dry mouth, No hearing loss and No nosebleeds.   Cardiovascular: No chest pain, No palpitations and No orthopnea.   Respiratory: No shortness of breath, No cough and No wheezing.   Gastrointestinal: No abdominal pain, No constipation, No nausea, No diarrhea, No vomiting and No melena.   Genitourinary: As noted in HPI.   Musculoskeletal: No back pain, No myalgias, No muscle weakness, No joint swelling and No leg edema.   Integumentary: No rashes, No skin lesion and No itching.   Neurological: No headache, No numbness and No  dizziness.   Psychiatric: No sleep disturbances, No anxiety and No depression.   Endocrine: No hot flashes, No loss of hair and No hirsutism.   Hematologic/Lymphatic: No swollen glands, No tendency for easy bleeding and No tendency for easy bruising.   All other systems have been reviewed and are negative for complaint.        Objective   Physical Exam  PHYSICAL EXAMINATION:  No LMP recorded. Patient is postmenopausal.  There is no height or weight on file to calculate BMI.  There were no vitals taken for this visit.  General Appearance: well appearing  Neuro: Alert and oriented   HEENT: mucous membranes moist, neck supple  Resp: No respiratory distress, normal work of breathing  MSK: normal range of motion, gait appropriate    Assessment/Plan   69-year-old with acute UTI symptoms, dysuria, and vaginal atrophy with recent pansensitive E. coli UTI.    #1 pending urine culture.  The patient has no urinary complaints prior to her most recent infection at the end of August.  She was recently treated with a prednisone taper.  We discussed proceeding with empiric UTI treatment and she will be started on ciprofloxacin now.  She will proceed with phenazopyridine use now.  Her kidney function is excellent.  She will be contacted with the results of her urine culture when available should she require alternate therapy.    #2 we discussed UTI prevention.  The patient will restart her vaginal estrogen therapy now.  We discussed its safety, efficacy, proper utilization.  Do not use any vaginal irritants including soaps.    3.  The patient will be contacted with her urine culture results should she require alternate therapy.  She will follow-up on an as-needed basis moving forward.    KIKE Garcia MD      Scribe Attestation  By signing my name below, ISarah, Scribcarter attest that this documentation has been prepared under the direction and in the presence of Nilesh Garcia MD. All medical record entries made  by the Scribe were at my direction or personally dictated by me. I have reviewed the chart and agree that the record accurately reflects my personal performance of the history, physical exam, discussion and plan.

## 2024-09-12 NOTE — PATIENT INSTRUCTIONS
Please start your twice daily ciprofloxacin antibiotic therapy to treat your presumed UTI.     You will be contacted with the results of your urine culture to discuss your therapy and possible antibiotic prophylaxis moving forward.    You have been provided a standing order for urinalysis and urine culture. Should you have any UTI-like symptoms, please present immediately to any [facility] lab to drop off a urine sample.     Please start your vaginal estrogen therapy nightly for 2 weeks and then 3 times a week thereafter. Do not use the plastic applicator and only use your fingertip.     Please start your Phenazopyridine  therapy, it will turn your pee bright orange.     Please follow-up virtually in 3 months to discuss your lower urinary tract symptoms.    Call the clinic with questions or concerns.    [phone]

## 2024-09-13 DIAGNOSIS — B37.31 YEAST VAGINITIS: Primary | ICD-10-CM

## 2024-09-13 LAB
APPEARANCE UR: ABNORMAL
BACTERIA #/AREA URNS AUTO: ABNORMAL /HPF
BILIRUB UR STRIP.AUTO-MCNC: NEGATIVE MG/DL
COLOR UR: ABNORMAL
GLUCOSE UR STRIP.AUTO-MCNC: NORMAL MG/DL
HOLD SPECIMEN: NORMAL
KETONES UR STRIP.AUTO-MCNC: NEGATIVE MG/DL
LEUKOCYTE ESTERASE UR QL STRIP.AUTO: ABNORMAL
MUCOUS THREADS #/AREA URNS AUTO: ABNORMAL /LPF
NITRITE UR QL STRIP.AUTO: NEGATIVE
PH UR STRIP.AUTO: 5.5 [PH]
PROT UR STRIP.AUTO-MCNC: ABNORMAL MG/DL
RBC # UR STRIP.AUTO: ABNORMAL /UL
RBC #/AREA URNS AUTO: >20 /HPF
SP GR UR STRIP.AUTO: 1.02
SQUAMOUS #/AREA URNS AUTO: ABNORMAL /HPF
UROBILINOGEN UR STRIP.AUTO-MCNC: NORMAL MG/DL
WBC #/AREA URNS AUTO: >50 /HPF
WBC CLUMPS #/AREA URNS AUTO: ABNORMAL /HPF
YEAST BUDDING #/AREA UR COMP ASSIST: PRESENT /HPF

## 2024-09-13 RX ORDER — FLUCONAZOLE 150 MG/1
TABLET ORAL
Qty: 2 TABLET | Refills: 0 | Status: SHIPPED | OUTPATIENT
Start: 2024-09-13

## 2024-09-15 LAB — BACTERIA UR CULT: ABNORMAL

## 2024-09-16 LAB — BACTERIA UR CULT: ABNORMAL

## 2024-10-07 DIAGNOSIS — M54.50 CHRONIC BILATERAL LOW BACK PAIN WITHOUT SCIATICA: ICD-10-CM

## 2024-10-07 DIAGNOSIS — G89.29 CHRONIC BILATERAL LOW BACK PAIN WITHOUT SCIATICA: ICD-10-CM

## 2024-10-07 RX ORDER — HYDROCODONE BITARTRATE AND ACETAMINOPHEN 7.5; 325 MG/1; MG/1
1 TABLET ORAL EVERY 8 HOURS
Qty: 20 TABLET | Refills: 0 | Status: SHIPPED | OUTPATIENT
Start: 2024-10-07

## 2024-10-14 ENCOUNTER — TELEPHONE (OUTPATIENT)
Dept: PRIMARY CARE | Facility: CLINIC | Age: 69
End: 2024-10-14
Payer: MEDICARE

## 2024-10-14 NOTE — TELEPHONE ENCOUNTER
C/O chronic headache for past 3 weeks, hx of brain surgery, she would like a call back to discuss her symptoms

## 2024-10-16 NOTE — TELEPHONE ENCOUNTER
Headache is always there, sometimes bad and other times dull.  Doesn't usually suffer from headaches.  Is in front and left side near temple.  Tylenol will dull it, but doesn't go away.  Can't  take Advil.  Eyes hurt.  Always dizzy, not new.  Not having any sinus symptoms.  Intensity at its worse is 7-8/10.  BP is good.  No red flags.  Will get into the office to evaluate further.

## 2024-10-17 ENCOUNTER — OFFICE VISIT (OUTPATIENT)
Dept: PRIMARY CARE | Facility: CLINIC | Age: 69
End: 2024-10-17
Payer: MEDICARE

## 2024-10-17 VITALS
OXYGEN SATURATION: 95 % | WEIGHT: 155.6 LBS | BODY MASS INDEX: 28.63 KG/M2 | SYSTOLIC BLOOD PRESSURE: 115 MMHG | HEIGHT: 62 IN | HEART RATE: 91 BPM | DIASTOLIC BLOOD PRESSURE: 69 MMHG

## 2024-10-17 DIAGNOSIS — R51.9 ACUTE INTRACTABLE HEADACHE, UNSPECIFIED HEADACHE TYPE: Primary | ICD-10-CM

## 2024-10-17 PROCEDURE — 1159F MED LIST DOCD IN RCRD: CPT | Performed by: FAMILY MEDICINE

## 2024-10-17 PROCEDURE — 3008F BODY MASS INDEX DOCD: CPT | Performed by: FAMILY MEDICINE

## 2024-10-17 PROCEDURE — 99213 OFFICE O/P EST LOW 20 MIN: CPT | Performed by: FAMILY MEDICINE

## 2024-10-17 PROCEDURE — 1123F ACP DISCUSS/DSCN MKR DOCD: CPT | Performed by: FAMILY MEDICINE

## 2024-10-17 PROCEDURE — 1160F RVW MEDS BY RX/DR IN RCRD: CPT | Performed by: FAMILY MEDICINE

## 2024-10-17 NOTE — PROGRESS NOTES
"Subjective   Patient ID: Michael Glover is a 69 y.o. female who presents for Headache.  Patient presents with complaints of headache for the past 3 weeks.  Frontal headache to left temple.  Pain in eyes and itching eyes.  Dizzy with standing.up, not new.  Has to pop ears frequently  Has some nasal congestion.  Not a lot of nasal drainage.  No photophobia.  No blurry vision, but hard to focus when goes from close to far vision.  Headache is always there, sometimes bad and other times dull.  Doesn't usually suffer from headaches.  Tylenol will dull it, but doesn't go away.  Can't  take Advil.  Intensity at its worse is 7-8/10.  BP is good when checked.              Review of Systems    Objective   /69   Pulse 91   Ht 1.575 m (5' 2\")   Wt 70.6 kg (155 lb 9.6 oz)   SpO2 95%   BMI 28.46 kg/m²     Physical Exam  Vitals reviewed.   Constitutional:       Appearance: Normal appearance.   HENT:      Ears:      Comments: TM's dull.     Nose: No rhinorrhea.      Mouth/Throat:      Pharynx: No oropharyngeal exudate or posterior oropharyngeal erythema.   Eyes:      Extraocular Movements: Extraocular movements intact.      Conjunctiva/sclera: Conjunctivae normal.      Pupils: Pupils are equal, round, and reactive to light.   Cardiovascular:      Rate and Rhythm: Normal rate and regular rhythm.      Heart sounds: No murmur heard.  Pulmonary:      Effort: Pulmonary effort is normal.      Breath sounds: Normal breath sounds.   Musculoskeletal:      Right lower leg: No edema.      Left lower leg: No edema.   Lymphadenopathy:      Cervical: No cervical adenopathy.   Neurological:      General: No focal deficit present.      Mental Status: She is alert and oriented to person, place, and time. Mental status is at baseline.   Psychiatric:         Mood and Affect: Mood normal.         Behavior: Behavior normal.           Assessment/Plan   Problem List Items Addressed This Visit       Headache - Primary    Relevant Orders    MR " brain wo IV contrast     Other Visit Diagnoses       Body mass index (BMI) 28.0-28.9, adult

## 2024-10-20 PROBLEM — E66.3 OVERWEIGHT WITH BODY MASS INDEX (BMI) OF 25 TO 25.9 IN ADULT: Status: RESOLVED | Noted: 2023-03-22 | Resolved: 2024-10-20

## 2024-10-20 PROBLEM — U07.1 COVID-19 VIRUS INFECTION: Status: RESOLVED | Noted: 2023-03-22 | Resolved: 2024-10-20

## 2024-10-20 PROBLEM — H57.89 EYE REDNESS: Status: RESOLVED | Noted: 2023-03-22 | Resolved: 2024-10-20

## 2024-10-20 NOTE — PATIENT INSTRUCTIONS
New headache for 3 weeks, without red flag.  Will check MRI of brain, due to history of brain surgery.  In meantime, treat for possible sinus headache with flonase nasal spray pseudoephedrine.  Follow up if if continued, worsening, or additional symptoms.

## 2024-10-30 ENCOUNTER — APPOINTMENT (OUTPATIENT)
Dept: ORTHOPEDIC SURGERY | Facility: CLINIC | Age: 69
End: 2024-10-30
Payer: MEDICARE

## 2024-10-30 DIAGNOSIS — M17.0 BILATERAL PRIMARY OSTEOARTHRITIS OF KNEE: Primary | ICD-10-CM

## 2024-10-30 PROCEDURE — 20610 DRAIN/INJ JOINT/BURSA W/O US: CPT | Performed by: STUDENT IN AN ORGANIZED HEALTH CARE EDUCATION/TRAINING PROGRAM

## 2024-10-30 PROCEDURE — 99214 OFFICE O/P EST MOD 30 MIN: CPT | Performed by: FAMILY MEDICINE

## 2024-10-30 PROCEDURE — 1123F ACP DISCUSS/DSCN MKR DOCD: CPT | Performed by: FAMILY MEDICINE

## 2024-10-30 PROCEDURE — 1159F MED LIST DOCD IN RCRD: CPT | Performed by: FAMILY MEDICINE

## 2024-10-30 PROCEDURE — 1036F TOBACCO NON-USER: CPT | Performed by: FAMILY MEDICINE

## 2024-10-30 RX ORDER — TRIAMCINOLONE ACETONIDE 40 MG/ML
40 INJECTION, SUSPENSION INTRA-ARTICULAR; INTRAMUSCULAR
Status: COMPLETED | OUTPATIENT
Start: 2024-10-30 | End: 2024-10-30

## 2024-10-30 RX ORDER — LIDOCAINE HYDROCHLORIDE 10 MG/ML
4 INJECTION, SOLUTION INFILTRATION; PERINEURAL
Status: COMPLETED | OUTPATIENT
Start: 2024-10-30 | End: 2024-10-30

## 2024-11-04 ENCOUNTER — HOSPITAL ENCOUNTER (OUTPATIENT)
Dept: RADIOLOGY | Facility: HOSPITAL | Age: 69
Discharge: HOME | End: 2024-11-04
Payer: MEDICARE

## 2024-11-04 DIAGNOSIS — R51.9 ACUTE INTRACTABLE HEADACHE, UNSPECIFIED HEADACHE TYPE: ICD-10-CM

## 2024-11-04 PROCEDURE — 70551 MRI BRAIN STEM W/O DYE: CPT | Performed by: RADIOLOGY

## 2024-11-04 PROCEDURE — 70551 MRI BRAIN STEM W/O DYE: CPT

## 2024-11-22 NOTE — PROGRESS NOTES
Subjective   Patient ID: Michael Glover is a 69 y.o. female who presents for Dysuria, urinary urgency, frequency  HPI  This visit was performed through telemedicine  69-year-old with acute UTI symptoms, dysuria, and vaginal atrophy with recent pansensitive E. coli UTI.          From Previous note  69 y.o.  patient presenting as a referral from Dr. Hannah Cast  with complaints of Dysuria, urinary urgency, frequency    The patient presents with complaints of dysuria and burning outside of voiding for the past 2 weeks. She had a UTI 8/20/2024, culture grew E coli and she was treated with Macrobid. However she symptoms did not resolve and continues to note dysuria. She also notes urinary urgency and frequency in the past 2 weeks. She notes episodes of nocturia but denies any enuresis. She voids every 20 minutes during the day. She denies leaking on laughing, cough or sneezing.She denies any history of nephrolithiasis, gross hematuria or chronic recurrent UTIs.    She has a h/o of pituitary surgery. Followed by COVID infection. She started on 5 days of prednisone to help reduce inflammation in lungs that can lead to the cough.     She is sexually active but denies any vaginal complaints, no abnormal vaginal bleeding or discharge. She denies any vaginal dryness or irritation. She denies any bulge complaints, no pressure or pulling.    She denies any bowel related complaints, no fecal or flatal incontinence.    She has no other complaints.        Review of Systems  Constitutional: No fever, No chills and No fatigue.   Eyes: No vision problems and No dryness of the eyes.   ENT: No dry mouth, No hearing loss and No nosebleeds.   Cardiovascular: No chest pain, No palpitations and No orthopnea.   Respiratory: No shortness of breath, No cough and No wheezing.   Gastrointestinal: No abdominal pain, No constipation, No nausea, No diarrhea, No vomiting and No melena.   Genitourinary: As noted in HPI.   Musculoskeletal: No back  pain, No myalgias, No muscle weakness, No joint swelling and No leg edema.   Integumentary: No rashes, No skin lesion and No itching.   Neurological: No headache, No numbness and No dizziness.   Psychiatric: No sleep disturbances, No anxiety and No depression.   Endocrine: No hot flashes, No loss of hair and No hirsutism.   Hematologic/Lymphatic: No swollen glands, No tendency for easy bleeding and No tendency for easy bruising.   All other systems have been reviewed and are negative for complaint.        Objective   Physical Exam  PHYSICAL EXAMINATION:  No LMP recorded. Patient is postmenopausal.  There is no height or weight on file to calculate BMI.  There were no vitals taken for this visit.  General Appearance: well appearing  Neuro: Alert and oriented   HEENT: mucous membranes moist, neck supple  Resp: No respiratory distress, normal work of breathing  MSK: normal range of motion, gait appropriate    Assessment/Plan   69-year-old with acute UTI symptoms, dysuria, and vaginal atrophy with recent pansensitive E. coli UTI.    #1 pending urine culture.  The patient has no urinary complaints prior to her most recent infection at the end of August.  She was recently treated with a prednisone taper.  We discussed proceeding with empiric UTI treatment and she will be started on ciprofloxacin now.  She will proceed with phenazopyridine use now.  Her kidney function is excellent.  She will be contacted with the results of her urine culture when available should she require alternate therapy.    #2 we discussed UTI prevention.  The patient will restart her vaginal estrogen therapy now.  We discussed its safety, efficacy, proper utilization.  Do not use any vaginal irritants including soaps.    3.  The patient will be contacted with her urine culture results should she require alternate therapy.  She will follow-up on an as-needed basis moving forward.    KIKE Garcia MD      Scribe Attestation  By signing my  name below, I, Otf Barrera attest that this documentation has been prepared under the direction and in the presence of Nilesh Garcia MD. All medical record entries made by the Scribe were at my direction or personally dictated by me. I have reviewed the chart and agree that the record accurately reflects my personal performance of the history, physical exam, discussion and plan.

## 2024-12-12 ENCOUNTER — APPOINTMENT (OUTPATIENT)
Dept: UROLOGY | Facility: CLINIC | Age: 69
End: 2024-12-12
Payer: MEDICARE

## 2024-12-17 DIAGNOSIS — M54.50 CHRONIC BILATERAL LOW BACK PAIN WITHOUT SCIATICA: ICD-10-CM

## 2024-12-17 DIAGNOSIS — G89.29 CHRONIC BILATERAL LOW BACK PAIN WITHOUT SCIATICA: ICD-10-CM

## 2024-12-17 RX ORDER — HYDROCODONE BITARTRATE AND ACETAMINOPHEN 7.5; 325 MG/1; MG/1
1 TABLET ORAL EVERY 8 HOURS
Qty: 20 TABLET | Refills: 0 | Status: SHIPPED | OUTPATIENT
Start: 2024-12-17

## 2024-12-27 ENCOUNTER — CLINICAL SUPPORT (OUTPATIENT)
Dept: PRIMARY CARE | Facility: CLINIC | Age: 69
End: 2024-12-27
Payer: MEDICARE

## 2024-12-27 DIAGNOSIS — N39.0 URINARY TRACT INFECTION WITHOUT HEMATURIA, SITE UNSPECIFIED: ICD-10-CM

## 2024-12-27 LAB
APPEARANCE UR: ABNORMAL
BACTERIA #/AREA URNS AUTO: ABNORMAL /HPF
BILIRUB UR STRIP.AUTO-MCNC: NEGATIVE MG/DL
COLOR UR: ABNORMAL
GLUCOSE UR STRIP.AUTO-MCNC: NORMAL MG/DL
KETONES UR STRIP.AUTO-MCNC: NEGATIVE MG/DL
LEUKOCYTE ESTERASE UR QL STRIP.AUTO: ABNORMAL
MUCOUS THREADS #/AREA URNS AUTO: ABNORMAL /LPF
NITRITE UR QL STRIP.AUTO: ABNORMAL
PH UR STRIP.AUTO: 6 [PH]
PROT UR STRIP.AUTO-MCNC: NEGATIVE MG/DL
RBC # UR STRIP.AUTO: NEGATIVE /UL
RBC #/AREA URNS AUTO: ABNORMAL /HPF
SP GR UR STRIP.AUTO: 1.01
UROBILINOGEN UR STRIP.AUTO-MCNC: NORMAL MG/DL
WBC #/AREA URNS AUTO: >50 /HPF
WBC CLUMPS #/AREA URNS AUTO: ABNORMAL /HPF

## 2024-12-27 PROCEDURE — 81001 URINALYSIS AUTO W/SCOPE: CPT

## 2024-12-27 PROCEDURE — 87086 URINE CULTURE/COLONY COUNT: CPT

## 2024-12-27 PROCEDURE — 87186 SC STD MICRODIL/AGAR DIL: CPT

## 2024-12-27 NOTE — PROGRESS NOTES
Pt seen for UTI Symptoms, unable to do POCT UA due to Urine discoloration due to medication taken OTC by patient for UTI symptoms. Send out UA and UC done.

## 2024-12-28 DIAGNOSIS — N30.00 ACUTE CYSTITIS WITHOUT HEMATURIA: Primary | ICD-10-CM

## 2024-12-28 RX ORDER — NITROFURANTOIN 25; 75 MG/1; MG/1
100 CAPSULE ORAL 2 TIMES DAILY
Qty: 14 CAPSULE | Refills: 0 | Status: SHIPPED | OUTPATIENT
Start: 2024-12-28 | End: 2025-01-04

## 2024-12-29 LAB — BACTERIA UR CULT: ABNORMAL

## 2024-12-31 ENCOUNTER — TELEPHONE (OUTPATIENT)
Dept: PRIMARY CARE | Facility: CLINIC | Age: 69
End: 2024-12-31
Payer: MEDICARE

## 2024-12-31 NOTE — LETTER
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    Should you have any questions please do not hesitate to call.    Thank you for your cooperation.    Sincerely,    Hannah Cast MD

## 2025-01-14 ENCOUNTER — OFFICE VISIT (OUTPATIENT)
Dept: PRIMARY CARE | Facility: CLINIC | Age: 70
End: 2025-01-14
Payer: MEDICARE

## 2025-01-14 VITALS
HEART RATE: 69 BPM | SYSTOLIC BLOOD PRESSURE: 114 MMHG | DIASTOLIC BLOOD PRESSURE: 70 MMHG | HEIGHT: 62 IN | BODY MASS INDEX: 29.26 KG/M2 | WEIGHT: 159 LBS | OXYGEN SATURATION: 96 % | TEMPERATURE: 97 F

## 2025-01-14 DIAGNOSIS — M54.50 CHRONIC RIGHT-SIDED LOW BACK PAIN WITHOUT SCIATICA: Primary | ICD-10-CM

## 2025-01-14 DIAGNOSIS — R10.31 GROIN PAIN, RIGHT: ICD-10-CM

## 2025-01-14 DIAGNOSIS — G89.29 CHRONIC RIGHT-SIDED LOW BACK PAIN WITHOUT SCIATICA: Primary | ICD-10-CM

## 2025-01-14 PROCEDURE — 3008F BODY MASS INDEX DOCD: CPT | Performed by: NURSE PRACTITIONER

## 2025-01-14 PROCEDURE — 1159F MED LIST DOCD IN RCRD: CPT | Performed by: NURSE PRACTITIONER

## 2025-01-14 PROCEDURE — 1123F ACP DISCUSS/DSCN MKR DOCD: CPT | Performed by: NURSE PRACTITIONER

## 2025-01-14 PROCEDURE — 1160F RVW MEDS BY RX/DR IN RCRD: CPT | Performed by: NURSE PRACTITIONER

## 2025-01-14 PROCEDURE — 1036F TOBACCO NON-USER: CPT | Performed by: NURSE PRACTITIONER

## 2025-01-14 PROCEDURE — 99213 OFFICE O/P EST LOW 20 MIN: CPT | Performed by: NURSE PRACTITIONER

## 2025-01-14 RX ORDER — ASPIRIN 325 MG
50000 TABLET, DELAYED RELEASE (ENTERIC COATED) ORAL
COMMUNITY
Start: 2024-12-13

## 2025-01-14 NOTE — PATIENT INSTRUCTIONS
Muscle sprain in groin  Recommend PT for groin pain and right lower back pain  Can use moist heat to area as needed to help with muscle pain  Follow up if symptoms are not improving with conservative treatment.

## 2025-01-14 NOTE — PROGRESS NOTES
"Subjective   Patient ID: Michael Glover is a 69 y.o. female who presents for Thigh pain (R upper thigh)    Presents for right groin pain  Seen previously in May for same pain.  At that time was intermittent. Hip XR at that time normal.  Over the last month has been constant  Is worse walking or changing positions  Denies any weakness in legs  Chronic lower back pain  Denies any changes in right lower back but constantly hurts  Has not done PT for this  Recently had bilateral knee injections, left knee feels better, right still painful  OTCs not helping.         Review of Systems    Objective     /70 (BP Location: Right arm, Patient Position: Sitting, BP Cuff Size: Adult)   Pulse 69   Temp 36.1 °C (97 °F) (Temporal)   Ht 1.575 m (5' 2\")   Wt 72.1 kg (159 lb)   SpO2 96%   BMI 29.08 kg/m²      Physical Exam  Vitals and nursing note reviewed.   Constitutional:       General: She is not in acute distress.     Appearance: Normal appearance.   Musculoskeletal:      Lumbar back: Tenderness (right lower, most painful at right SI joint) present. Negative right straight leg raise test and negative left straight leg raise test.      Right hip: No bony tenderness or crepitus. Normal range of motion.      Left hip: Normal.      Right upper leg: Tenderness (groin and upper quad) present. No swelling or edema.      Left upper leg: No swelling.      Comments: No femoral or inguinal hernia   Neurological:      Motor: No weakness.      Gait: Gait abnormal (antalgic).          Assessment/Plan   Diagnoses and all orders for this visit:  Chronic right-sided low back pain without sciatica  -     Referral to Physical Therapy; Future  Groin pain, right  -     Referral to Physical Therapy; Future      Patient Instructions   Muscle sprain in groin  Recommend PT for groin pain and right lower back pain  Can use moist heat to area as needed to help with muscle pain  Follow up if symptoms are not improving with conservative " treatment.

## 2025-01-22 ENCOUNTER — TELEPHONE (OUTPATIENT)
Dept: PRIMARY CARE | Facility: CLINIC | Age: 70
End: 2025-01-22
Payer: MEDICARE

## 2025-01-22 DIAGNOSIS — M54.50 CHRONIC BILATERAL LOW BACK PAIN WITHOUT SCIATICA: Primary | ICD-10-CM

## 2025-01-22 DIAGNOSIS — G89.29 CHRONIC BILATERAL LOW BACK PAIN WITHOUT SCIATICA: Primary | ICD-10-CM

## 2025-01-22 NOTE — TELEPHONE ENCOUNTER
Michael still experiencing right thigh and lower back pain and would like an order for x-ray and notification once order has been placed in the system

## 2025-01-23 ENCOUNTER — HOSPITAL ENCOUNTER (OUTPATIENT)
Dept: RADIOLOGY | Facility: CLINIC | Age: 70
Discharge: HOME | End: 2025-01-23
Payer: MEDICARE

## 2025-01-23 DIAGNOSIS — G89.29 CHRONIC BILATERAL LOW BACK PAIN WITHOUT SCIATICA: ICD-10-CM

## 2025-01-23 DIAGNOSIS — M54.50 CHRONIC BILATERAL LOW BACK PAIN WITHOUT SCIATICA: ICD-10-CM

## 2025-01-23 PROCEDURE — 72110 X-RAY EXAM L-2 SPINE 4/>VWS: CPT | Performed by: RADIOLOGY

## 2025-01-23 PROCEDURE — 72110 X-RAY EXAM L-2 SPINE 4/>VWS: CPT

## 2025-01-27 ENCOUNTER — OFFICE VISIT (OUTPATIENT)
Dept: ORTHOPEDIC SURGERY | Facility: HOSPITAL | Age: 70
End: 2025-01-27
Payer: MEDICARE

## 2025-01-27 DIAGNOSIS — M17.12 LOCALIZED OSTEOARTHRITIS OF LEFT KNEE: ICD-10-CM

## 2025-01-27 DIAGNOSIS — M17.11 LOCALIZED OSTEOARTHRITIS OF RIGHT KNEE: Primary | ICD-10-CM

## 2025-01-27 DIAGNOSIS — S83.411A SPRAIN OF MEDIAL COLLATERAL LIGAMENT OF RIGHT KNEE, INITIAL ENCOUNTER: ICD-10-CM

## 2025-01-27 PROCEDURE — 2500000004 HC RX 250 GENERAL PHARMACY W/ HCPCS (ALT 636 FOR OP/ED): Performed by: FAMILY MEDICINE

## 2025-01-27 PROCEDURE — 20610 DRAIN/INJ JOINT/BURSA W/O US: CPT | Mod: RT | Performed by: FAMILY MEDICINE

## 2025-01-27 PROCEDURE — 1159F MED LIST DOCD IN RCRD: CPT | Performed by: FAMILY MEDICINE

## 2025-01-27 PROCEDURE — 99214 OFFICE O/P EST MOD 30 MIN: CPT | Performed by: FAMILY MEDICINE

## 2025-01-27 PROCEDURE — L1812 KO ELASTIC W/JOINTS PRE OTS: HCPCS | Performed by: FAMILY MEDICINE

## 2025-01-27 PROCEDURE — 99214 OFFICE O/P EST MOD 30 MIN: CPT | Mod: 25 | Performed by: FAMILY MEDICINE

## 2025-01-27 PROCEDURE — 1036F TOBACCO NON-USER: CPT | Performed by: FAMILY MEDICINE

## 2025-01-27 PROCEDURE — 1123F ACP DISCUSS/DSCN MKR DOCD: CPT | Performed by: FAMILY MEDICINE

## 2025-01-27 RX ORDER — LIDOCAINE HYDROCHLORIDE 10 MG/ML
4 INJECTION, SOLUTION INFILTRATION; PERINEURAL
Status: COMPLETED | OUTPATIENT
Start: 2025-01-27 | End: 2025-01-27

## 2025-01-27 RX ORDER — TRIAMCINOLONE ACETONIDE 40 MG/ML
40 INJECTION, SUSPENSION INTRA-ARTICULAR; INTRAMUSCULAR
Status: COMPLETED | OUTPATIENT
Start: 2025-01-27 | End: 2025-01-27

## 2025-01-27 RX ADMIN — LIDOCAINE HYDROCHLORIDE 4 ML: 10 INJECTION, SOLUTION INFILTRATION; PERINEURAL at 12:54

## 2025-01-27 RX ADMIN — TRIAMCINOLONE ACETONIDE 40 MG: 200 INJECTION, SUSPENSION INTRA-ARTICULAR; INTRAMUSCULAR at 12:54

## 2025-01-27 NOTE — PROGRESS NOTES
** Please excuse any errors in grammar or translation related to this dictation. Voice recognition software was utilized to prepare this document. **    Assessment & Plan:  68 y/o presenting with acute on chronic right knee pain.  She has history of bilateral knee arthritis which has been managed with periodic intra-articular steroid injections which were last completed on 10/30/2024.  She continues to get good relief in her left knee.  Right knee acutely flared after twisting her knee a little over a month ago.  In addition to her OA, likely suffered mild MCL sprain from her injury.  For this recommend use of hinged knee brace for the next 4 weeks in addition to completing physical therapy.  She declines physical therapy referral; instead home exercise program was provided and advise she complete 4 times weekly.  For her acute flare of arthritis pain did offer to repeat intra-articular injection which she agreed to have performed in her right knee only.  Discussed with patient if condition not improving over the next 4 weeks want to see her back for reassessment which he agrees to.      Patient was prescribed a hinged knee brace for right MCL sprain. The patient has weakness, pain, instability and/or deformity of their right knee which requires stabilization from this orthosis to improve their function.    Verbal instructions for the use, wear schedule, cleaning and application of this item were given.  Patient was instructed that should the brace result in increased pain, decreased sensation, increased swelling, or an overall worsening of their medical condition, to please contact our office immediately. Orthotic management and training was provided for skin care, modifications due to healing tissues, edema changes, interruption in skin integrity, and safety precautions with the orthosis.      Chief complaint:  Bilateral knee pain  HPI:  1/27/25: Patient reports the injections on 10/30 provided good relief for  both knees until she slipped and twisted the right knee before Dimitri. Swelling is intermittent in the right knee.   Pain is felt over the anteromedial right knee.  Pain is still primarily just below the patella.  Tylenol as needed gives her some relief.  No current issues with her left knee.    10/30/2024: Patient returns for chronic bilaterally knee pain R>L. Patient reports bilateral knee injections from 9/6/2023 provided relief for 9 months. Since then she has been using tylenol 500mg once a week and voltaren gel off and on which takes the edge off. Denies any new injuries, trauma, fall. She is requesting repeat bilateral corticosteroid injection if appropriate. She understands the benefit, risks and alternative and would like to proceed with repeat bilateral corticosteroid injection.     9/6/2023: 68 y/o F, hx of hypothyroid, presents with bilateral knee pain for a few years. Pain has progressively worsened over time. Right is more symptomatic than left. Pain is on the anteromedial knee. She takes Tylenol as needed which gives her some relief. Prescribed meloxicam from her PCP which she takes for few days when her pain symptoms increase. Reports having a steroid injection completed by Dr. Leone in May of this year. Provided approximately 3 months of pain relief. No previous knee surgery reported. No traumatic injuries to knees reported.     Patient Active Problem List   Diagnosis    Back pain    Benign head tremor    Chronic bilateral low back pain without sciatica    Dermatitis    Diabetes insipidus    GERD (gastroesophageal reflux disease)    Headache    Hypopituitarism (Multi)    Left shoulder pain    Lump, breast    Opioid use    Osteoporosis    Pain of both thumbs    Pelvic pain in female    Rectal bleeding    Right foot pain    Skin irritation    Strain of lumbar region    Swelling of thigh    Cough in adult    Lt flank pain    Pain of left thumb    Pain of right thumb    Renal insufficiency    Skin  lesion of face    Cervical spine pain    Stiffness of cervical spine     Past Surgical History:   Procedure Laterality Date    OTHER SURGICAL HISTORY  03/27/2021    Pituitary surgery     Current Outpatient Medications on File Prior to Visit   Medication Sig Dispense Refill    alendronate (Fosamax) 70 mg tablet Fosamax 70 MG Oral Tablet   Refills: 0       Active      benzonatate (Tessalon) 200 mg capsule Take 1 capsule (200 mg) by mouth 3 times a day as needed for cough. Do not crush or chew. 30 capsule 0    cholecalciferol (Vitamin D-3) 50,000 unit capsule Take 1 capsule (50,000 Units) by mouth every 14 (fourteen) days.      clobetasol (Temovate) 0.05 % ointment APPLY TO AFFECTED AREA TWICE A DAY      desmopressin (DDAVP) 0.1 mg tablet DDAVP 0.1 MG Oral Tablet   Refills: 0       Active      desonide (DesOwen) 0.05 % cream Apply topically 2 times a day as needed for irritation. 60 g 0    HYDROcodone-acetaminophen (Norco) 7.5-325 mg tablet Take 1 tablet by mouth every 8 hours. 20 tablet 0    hydrocortisone (Cortef) 5 mg tablet Cortef 5 MG Oral Tablet   Refills: 0       Active      levothyroxine (Synthroid, Levoxyl) 112 mcg tablet Synthroid 112 MCG Oral Tablet   Refills: 0       Active      meloxicam (Mobic) 15 mg tablet Take 1 tablet (15 mg) by mouth once daily.      naloxone (Narcan) 4 mg/0.1 mL nasal spray Administer 1 spray (4 mg) into affected nostril(s) 1 time if needed.      omeprazole OTC (PriLOSEC OTC) 20 mg EC tablet Take 1 tablet (20 mg) by mouth once daily in the morning. Take before meals. Do not crush, chew, or split. 90 tablet      No current facility-administered medications on file prior to visit.       Exam:  Right Knee examined. No effusion, ecchymosis, warmth or erythema.  AROM from 0 to 130 deg with 5/5 strength. SILT overlying knee. Retropatellar crepitus present. Tenderness along medial joint line. No popliteal mass palpated. Negative lachman's. Pain w/o laxity with valgus stress over MCL.  No  laxity to varus stress at 0 or 30 deg.  No patellar apprehension.  Negative Emma.    Left Knee examined. No effusion, ecchymosis, warmth or erythema.  AROM from 0 to 130 deg with 5/5 strength. SILT overlying knee. Retropatellar crepitus present. No joint line ttp.     Results:  Xrays of bilateral knee obtained 5/11/2023: mild to moderate degenerative changes with joint space narrowing, osteophyte formation, and subchondral sclerosis.     Lab Results   Component Value Date    HGBA1C 5.2 12/20/2022    HGBA1C 5.0 12/16/2022    CREATININE 1.03 02/23/2023     Procedure:  Patient ID: Michael Glover is a 69 y.o. female.    L Inj/Asp: R knee on 1/27/2025 12:54 PM  Indications: pain  Details: 25 G needle, anterolateral approach  Medications: 40 mg triamcinolone acetonide 40 mg/mL; 4 mL lidocaine 10 mg/mL (1 %)  Outcome: tolerated well, no immediate complications    Procedure risk factors to include increased pain, bleeding, infection, neurovascular injury, soft tissue injury, progressive cartilage loss, transient elevation of blood glucose and blood pressure, and adverse reaction to medication were discussed with the patient. Patient understands there is a moderate risk of morbidity from undergoing the procedure.    Procedure, treatment alternatives, risks and benefits explained, specific risks discussed. Consent was given by the patient. Immediately prior to procedure a time out was called to verify the correct patient, procedure, equipment, support staff and site/side marked as required. Patient was prepped and draped in the usual sterile fashion.

## 2025-02-18 ENCOUNTER — TELEPHONE (OUTPATIENT)
Dept: PRIMARY CARE | Facility: CLINIC | Age: 70
End: 2025-02-18

## 2025-02-18 ENCOUNTER — OFFICE VISIT (OUTPATIENT)
Dept: PRIMARY CARE | Facility: CLINIC | Age: 70
End: 2025-02-18
Payer: MEDICARE

## 2025-02-18 VITALS
HEART RATE: 77 BPM | SYSTOLIC BLOOD PRESSURE: 122 MMHG | DIASTOLIC BLOOD PRESSURE: 80 MMHG | OXYGEN SATURATION: 99 % | TEMPERATURE: 97.3 F | HEIGHT: 62 IN | WEIGHT: 159.2 LBS | BODY MASS INDEX: 29.3 KG/M2

## 2025-02-18 DIAGNOSIS — K64.8 INTERNAL HEMORRHOIDS: Primary | ICD-10-CM

## 2025-02-18 DIAGNOSIS — M54.50 CHRONIC BILATERAL LOW BACK PAIN WITHOUT SCIATICA: ICD-10-CM

## 2025-02-18 DIAGNOSIS — G89.29 CHRONIC BILATERAL LOW BACK PAIN WITHOUT SCIATICA: ICD-10-CM

## 2025-02-18 PROCEDURE — 1159F MED LIST DOCD IN RCRD: CPT | Performed by: FAMILY MEDICINE

## 2025-02-18 PROCEDURE — 1123F ACP DISCUSS/DSCN MKR DOCD: CPT | Performed by: FAMILY MEDICINE

## 2025-02-18 PROCEDURE — 3008F BODY MASS INDEX DOCD: CPT | Performed by: FAMILY MEDICINE

## 2025-02-18 PROCEDURE — 1160F RVW MEDS BY RX/DR IN RCRD: CPT | Performed by: FAMILY MEDICINE

## 2025-02-18 PROCEDURE — 1036F TOBACCO NON-USER: CPT | Performed by: FAMILY MEDICINE

## 2025-02-18 PROCEDURE — 99214 OFFICE O/P EST MOD 30 MIN: CPT | Performed by: FAMILY MEDICINE

## 2025-02-18 RX ORDER — HYDROCODONE BITARTRATE AND ACETAMINOPHEN 7.5; 325 MG/1; MG/1
1 TABLET ORAL EVERY 8 HOURS
Qty: 20 TABLET | Refills: 0 | Status: SHIPPED | OUTPATIENT
Start: 2025-02-18

## 2025-02-18 RX ORDER — HYDROCORTISONE 25 MG/G
CREAM TOPICAL 2 TIMES DAILY
Qty: 30 G | Refills: 0 | Status: SHIPPED | OUTPATIENT
Start: 2025-02-18 | End: 2025-02-23

## 2025-02-18 ASSESSMENT — ENCOUNTER SYMPTOMS
LOSS OF SENSATION IN FEET: 0
DEPRESSION: 0
OCCASIONAL FEELINGS OF UNSTEADINESS: 0

## 2025-02-18 NOTE — PATIENT INSTRUCTIONS
Intermittent rectal bleeding with BM's over the past month.  Based on exam, history, and colonoscopy, internal hemorrhoids are the most likely cause.  Will treat with anusol cream, inserted twice a day for 5 day.  If symptoms persist, will refer to gastroenterologist for further evaluation.    Right groin pain, for quite a while, but worsening over the past couple months.  X-Ray normal a year ago.  Possibly related to the back, but feel it's less likely.  Follow up with Dr. Nguyen for further evaluation.  MRI may be indicated.

## 2025-02-18 NOTE — PROGRESS NOTES
"Subjective   Patient ID: Michael Glover is a 69 y.o. female who presents for Rectal Bleeding.  Having blood with BM for about a month, but not every day.  No constipation.  No history of hemorrhoids.  Sometimes feels like there is a cramp when it happens.  Is bright red, just on tissue, but can be stringy at times.  None on underwear or in toilet.  Colonoscopy done 1/3/23 which was normal, repeat in 10 years. There were some small internal hemorrhoids seen, which were not bleeding.    Also right groin pain, which has been getting worse over the past couple months.  Right hip X-Ray normal a year ago.  Has chronic back problems, and it has been hurting recently as well.  Doesn't want to do PT, due to high copay.  Has started doing stretches, which help.  Also would like refill of pain med, which she doesn't use often.  No numbness or tingling into the leg.    I have personally reviewed the [place]AR[place] report for this patient.  This report is documented into the EMR.  I have considered the risks of abuse, dependence, addiction and diversion.          Review of Systems    Objective   /80 (BP Location: Right arm, Patient Position: Sitting, BP Cuff Size: Adult)   Pulse 77   Temp 36.3 °C (97.3 °F) (Temporal)   Ht 1.575 m (5' 2\")   Wt 72.2 kg (159 lb 3.2 oz)   SpO2 99%   BMI 29.12 kg/m²     Physical Exam  Vitals reviewed.   Constitutional:       Appearance: Normal appearance.   Genitourinary:     Rectum: Internal hemorrhoid (able to see just inside rectum, but spreading the anus, and 2 irritated hemorrhoids seen.) present. No external hemorrhoid. Normal anal tone.   Musculoskeletal:      Lumbar back: Negative right straight leg raise test.      Right hip: Tenderness (right groin area, without mass) present. No crepitus. Normal range of motion (but pain with internal and external rotation). Normal strength.   Neurological:      Mental Status: She is alert.   Psychiatric:         Mood and Affect: Mood normal.         " Behavior: Behavior normal.           Assessment/Plan   Problem List Items Addressed This Visit       Chronic bilateral low back pain without sciatica    Relevant Medications    HYDROcodone-acetaminophen (Norco) 7.5-325 mg tablet     Other Visit Diagnoses       Internal hemorrhoids    -  Primary    Relevant Medications    hydrocortisone (Anusol-HC) 2.5 % rectal cream

## 2025-03-25 ENCOUNTER — HOSPITAL ENCOUNTER (OUTPATIENT)
Dept: RADIOLOGY | Facility: EXTERNAL LOCATION | Age: 70
Discharge: HOME | End: 2025-03-25

## 2025-03-25 ENCOUNTER — APPOINTMENT (OUTPATIENT)
Dept: ORTHOPEDIC SURGERY | Facility: CLINIC | Age: 70
End: 2025-03-25
Payer: MEDICARE

## 2025-03-25 ENCOUNTER — HOSPITAL ENCOUNTER (OUTPATIENT)
Dept: RADIOLOGY | Facility: CLINIC | Age: 70
Discharge: HOME | End: 2025-03-25
Payer: MEDICARE

## 2025-03-25 VITALS — WEIGHT: 155 LBS | BODY MASS INDEX: 28.52 KG/M2 | HEIGHT: 62 IN

## 2025-03-25 DIAGNOSIS — M16.11 PRIMARY OSTEOARTHRITIS OF RIGHT HIP: Primary | ICD-10-CM

## 2025-03-25 DIAGNOSIS — M25.551 PAIN IN RIGHT HIP: ICD-10-CM

## 2025-03-25 PROCEDURE — 3008F BODY MASS INDEX DOCD: CPT | Performed by: FAMILY MEDICINE

## 2025-03-25 PROCEDURE — 1159F MED LIST DOCD IN RCRD: CPT | Performed by: FAMILY MEDICINE

## 2025-03-25 PROCEDURE — 20611 DRAIN/INJ JOINT/BURSA W/US: CPT | Performed by: FAMILY MEDICINE

## 2025-03-25 PROCEDURE — 99214 OFFICE O/P EST MOD 30 MIN: CPT | Performed by: FAMILY MEDICINE

## 2025-03-25 PROCEDURE — 73502 X-RAY EXAM HIP UNI 2-3 VIEWS: CPT | Mod: RT

## 2025-03-25 PROCEDURE — 1123F ACP DISCUSS/DSCN MKR DOCD: CPT | Performed by: FAMILY MEDICINE

## 2025-03-25 PROCEDURE — 1036F TOBACCO NON-USER: CPT | Performed by: FAMILY MEDICINE

## 2025-03-25 RX ORDER — LIDOCAINE HYDROCHLORIDE 10 MG/ML
4 INJECTION, SOLUTION INFILTRATION; PERINEURAL
Status: COMPLETED | OUTPATIENT
Start: 2025-03-25 | End: 2025-03-25

## 2025-03-25 RX ORDER — TRIAMCINOLONE ACETONIDE 40 MG/ML
80 INJECTION, SUSPENSION INTRA-ARTICULAR; INTRAMUSCULAR
Status: COMPLETED | OUTPATIENT
Start: 2025-03-25 | End: 2025-03-25

## 2025-03-25 RX ADMIN — TRIAMCINOLONE ACETONIDE 80 MG: 40 INJECTION, SUSPENSION INTRA-ARTICULAR; INTRAMUSCULAR at 09:17

## 2025-03-25 RX ADMIN — LIDOCAINE HYDROCHLORIDE 4 ML: 10 INJECTION, SOLUTION INFILTRATION; PERINEURAL at 09:17

## 2025-03-25 ASSESSMENT — PAIN SCALES - GENERAL: PAINLEVEL_OUTOF10: 5 - MODERATE PAIN

## 2025-03-25 ASSESSMENT — PAIN - FUNCTIONAL ASSESSMENT: PAIN_FUNCTIONAL_ASSESSMENT: 0-10

## 2025-03-25 NOTE — PROGRESS NOTES
** Please excuse any errors in grammar or translation related to this dictation. Voice recognition software was utilized to prepare this document. **    Assessment & Plan:  Clinical presentation is most consistent with right hip arthritis.  No current radicular symptoms to suggest lumbar spine involvement at this time.  Discuss with patient the nature of this disease and how it can be progressive. Discuss how symptoms can wax and wane in severity.  Initial non-operative treatment options reviewed below.  Patient provided handout that reviews this condition and available treatments.   - Maintaining a healthy weight: Every pound of bodyweight is about 4-5 pounds through the lower extremity.   - Exercise program to improve muscle strength for support.  Given HEP handout today.  - Activity modifications as needed to include use of cane/walker or braces.  - Analgesics to include but not limited to Tylenol up to 3g daily, ibuprofen up to 2.5g daily.  - Steroid injection.  After considering treatment options, patient elects to have right hip intra-articular CSI.  Informed patient that steroid injection can be repeated every 3 or more months as symptoms dictate. Follow-up as needed for ongoing management.  All questions answered and patient is agreeable to this plan.       Chief complaint:  Right hip pain  HPI:  69-year-old female, patient of mine seen for knee arthritis previously, presents today with right hip pain.  Pain ongoing for the last 5 to 6 months.  No injury reported.  Pain is localized to her groin and proximal anterior thigh.  She has been trying to manage her symptoms by completing therapy exercises and stretches she found online.  These helped some with mobility do not really help with her pain.  She has history of lower back pain but denies any current radicular symptoms.  No prior hip surgery reported.  No prior hip injections reported.    Patient Active Problem List   Diagnosis    Back pain    Benign head  tremor    Chronic bilateral low back pain without sciatica    Dermatitis    Diabetes insipidus    GERD (gastroesophageal reflux disease)    Headache    Hypopituitarism (Multi)    Left shoulder pain    Lump, breast    Opioid use    Osteoporosis    Pain of both thumbs    Pelvic pain in female    Rectal bleeding    Right foot pain    Skin irritation    Strain of lumbar region    Swelling of thigh    Cough in adult    Lt flank pain    Pain of left thumb    Pain of right thumb    Renal insufficiency    Skin lesion of face    Cervical spine pain    Stiffness of cervical spine     Past Surgical History:   Procedure Laterality Date    OTHER SURGICAL HISTORY  03/27/2021    Pituitary surgery     Current Outpatient Medications on File Prior to Visit   Medication Sig Dispense Refill    alendronate (Fosamax) 70 mg tablet Fosamax 70 MG Oral Tablet   Refills: 0       Active      benzonatate (Tessalon) 200 mg capsule Take 1 capsule (200 mg) by mouth 3 times a day as needed for cough. Do not crush or chew. 30 capsule 0    cholecalciferol (Vitamin D-3) 50,000 unit capsule Take 1 capsule (50,000 Units) by mouth every 14 (fourteen) days.      clobetasol (Temovate) 0.05 % ointment APPLY TO AFFECTED AREA TWICE A DAY      desmopressin (DDAVP) 0.1 mg tablet DDAVP 0.1 MG Oral Tablet   Refills: 0       Active      desonide (DesOwen) 0.05 % cream Apply topically 2 times a day as needed for irritation. 60 g 0    HYDROcodone-acetaminophen (Norco) 7.5-325 mg tablet Take 1 tablet by mouth every 8 hours. 20 tablet 0    hydrocortisone (Anusol-HC) 2.5 % rectal cream Insert into the rectum 2 times a day for 5 days. 30 g 0    hydrocortisone (Cortef) 5 mg tablet Cortef 5 MG Oral Tablet   Refills: 0       Active      levothyroxine (Synthroid, Levoxyl) 112 mcg tablet Synthroid 112 MCG Oral Tablet   Refills: 0       Active      meloxicam (Mobic) 15 mg tablet Take 1 tablet (15 mg) by mouth once daily.      naloxone (Narcan) 4 mg/0.1 mL nasal spray  Administer 1 spray (4 mg) into affected nostril(s) 1 time if needed.      omeprazole OTC (PriLOSEC OTC) 20 mg EC tablet Take 1 tablet (20 mg) by mouth once daily in the morning. Take before meals. Do not crush, chew, or split. 90 tablet      No current facility-administered medications on file prior to visit.       Exam:  General Exam:  Constitutional - NAD, AAO x 3, conversing appropriately.  HEENT- Normocephalic and atraumatic. No facial deformities. Hearing grossly normal.  Lungs - Breathing non-labored with normal rate. No accessory muscle use.  CV - Extremities warm and well-perfused, brisk capillary refill present.   Neuro - CN II-XII grossly intact.    Right hip Exam:  [Normal gait]  No warmth, erythema or ecchymosis overlying.  Active flexion >90 degrees.  IR 30 deg, ER 50 deg.  NTTP over greater trochanter, glute tendons, proximal ITB, ischial tuberosity  [5]/5 strength of hip flexion, abduction, & adduction  SILT  [ - ]Log roll pain, [+]FADIR, [ - ]FELISHA, [+]Stinchfield,  [+]Scour  [ - ]Resisted external derotation test    Results:  Xrays of right hip obtained 3/25/2025 personally interpreted as mild degenerative changes with joint space narrowing and subchondral sclerosis.     Lab Results   Component Value Date    HGBA1C 5.2 12/20/2022    HGBA1C 5.0 12/16/2022    CREATININE 1.03 02/23/2023       Procedure:  Patient ID: Michael Glover is a 69 y.o. female.    L Inj/Asp: R hip joint on 3/25/2025 9:17 AM  Indications: pain  Details: 22 G (spinal) needle, ultrasound-guided anterior approach  Medications: 80 mg triamcinolone acetonide 40 mg/mL; 4 mL lidocaine 10 mg/mL (1 %)  Outcome: tolerated well, no immediate complications    Procedure risk factors to include increased pain, bleeding, infection, neurovascular injury, soft tissue injury, progressive cartilage loss, transient elevation of blood glucose and blood pressure, and adverse reaction to medication were discussed with the patient. Patient understands  there is a moderate risk of morbidity from undergoing the procedure.  Procedure, treatment alternatives, risks and benefits explained, specific risks discussed. Consent was given by the patient. Immediately prior to procedure a time out was called to verify the correct patient, procedure, equipment, support staff and site/side marked as required. Patient was prepped and draped in the usual sterile fashion.

## 2025-05-08 DIAGNOSIS — G89.29 CHRONIC BILATERAL LOW BACK PAIN WITHOUT SCIATICA: ICD-10-CM

## 2025-05-08 DIAGNOSIS — M54.50 CHRONIC BILATERAL LOW BACK PAIN WITHOUT SCIATICA: ICD-10-CM

## 2025-05-08 RX ORDER — HYDROCODONE BITARTRATE AND ACETAMINOPHEN 7.5; 325 MG/1; MG/1
1 TABLET ORAL EVERY 8 HOURS
Qty: 20 TABLET | Refills: 0 | Status: SHIPPED | OUTPATIENT
Start: 2025-05-08

## 2025-05-10 ENCOUNTER — OFFICE VISIT (OUTPATIENT)
Dept: URGENT CARE | Age: 70
End: 2025-05-10
Payer: MEDICARE

## 2025-05-10 VITALS
TEMPERATURE: 97.3 F | BODY MASS INDEX: 26.46 KG/M2 | HEIGHT: 64 IN | DIASTOLIC BLOOD PRESSURE: 73 MMHG | WEIGHT: 155 LBS | OXYGEN SATURATION: 99 % | RESPIRATION RATE: 20 BRPM | HEART RATE: 85 BPM | SYSTOLIC BLOOD PRESSURE: 121 MMHG

## 2025-05-10 DIAGNOSIS — N30.00 ACUTE CYSTITIS WITHOUT HEMATURIA: Primary | ICD-10-CM

## 2025-05-10 LAB
POC APPEARANCE, URINE: ABNORMAL
POC BILIRUBIN, URINE: NEGATIVE
POC BLOOD, URINE: ABNORMAL
POC COLOR, URINE: YELLOW
POC GLUCOSE, URINE: NEGATIVE MG/DL
POC KETONES, URINE: ABNORMAL MG/DL
POC LEUKOCYTES, URINE: ABNORMAL
POC NITRITE,URINE: NEGATIVE
POC PH, URINE: 6 PH
POC PROTEIN, URINE: NEGATIVE MG/DL
POC SPECIFIC GRAVITY, URINE: 1.02
POC UROBILINOGEN, URINE: 0.2 EU/DL

## 2025-05-10 RX ORDER — FLUCONAZOLE 150 MG/1
150 TABLET ORAL ONCE
Qty: 2 TABLET | Refills: 0 | Status: SHIPPED | OUTPATIENT
Start: 2025-05-10 | End: 2025-05-10

## 2025-05-10 RX ORDER — CEPHALEXIN 500 MG/1
500 CAPSULE ORAL 2 TIMES DAILY
Qty: 14 CAPSULE | Refills: 0 | Status: SHIPPED | OUTPATIENT
Start: 2025-05-10 | End: 2025-05-17

## 2025-05-10 NOTE — PROGRESS NOTES
"Subjective   Patient ID: Michael Glover is a 69 y.o. female. They present today with a chief complaint of UTI (UTI--symptom started yesterday. +urge.  Pt put on Meloxican and UTI started shortly after.).    History of Present Illness    UTI    This is a 77-year-old female here for UTI symptoms.  Patient states that she took meloxicam earlier in the week for right leg pain.  She then started with urinary symptoms on Thursday.  Endorses dysuria and urgency.  Denies fever, chills, hematuria, abdominal/flank/back pain.  She has had UTIs before and this feels similar.  Denies vaginal discharge or bleeding.  Past Medical History  Allergies as of 05/10/2025 - Reviewed 05/10/2025   Allergen Reaction Noted    Sulfa (sulfonamide antibiotics) Unknown 06/30/2021       Prescriptions Prior to Admission[1]     Medical History[2]    Surgical History[3]     reports that she has quit smoking. Her smoking use included cigarettes. She has never been exposed to tobacco smoke. She has never used smokeless tobacco. She reports that she does not currently use alcohol after a past usage of about 1.0 standard drink of alcohol per week. She reports that she does not use drugs.    Review of Systems  Review of Systems   All other systems reviewed and are negative.                                 Objective    Vitals:    05/10/25 1640   BP: 121/73   Pulse: 85   Resp: 20   Temp: 36.3 °C (97.3 °F)   SpO2: 99%   Weight: 70.3 kg (155 lb)   Height: 1.626 m (5' 4\")     No LMP recorded. Patient is postmenopausal.    Physical Exam  Physical Exam    General: Vitals noted, no distress. Afebrile.    EENT: Posterior oropharynx unremarkable.    Cardiac: Regular, rate, rhythm    Pulmonary: Lungs clear bilaterally with good aeration. No adventitious breath sounds.    Abdomen: Soft, nonsurgical. Nontender. No peritoneal signs. Normoactive bowel sounds.    Back: No CVA tenderness bilaterally.    Extremities: No peripheral edema. Neurovascularly intact " throughout.    Skin: No rash.    Neuro: No gross neurological deficits.  Procedures    Point of Care Test & Imaging Results from this visit  No results found for this visit on 05/10/25.   Imaging  No results found.    Cardiology, Vascular, and Other Imaging  No other imaging results found for the past 2 days      Diagnostic study results (if any) were reviewed by Pito Long PA-C.    Assessment/Plan   Allergies, medications, history, and pertinent labs/EKGs/Imaging reviewed by Pito Long PA-C.     Medical Decision Making  Summary: Patient presents to urgent care for UTI symptoms. Patient is well-appearing nontoxic on the exam. Vital signs reviewed. Differential diagnosis includes not limited to UTI, pyelonephritis, or nephrolithiasis.  Urine dip demonstrates infection. Urine sent for culture.  Keflex sent to the pharmacy.  Patient sometimes gets yeast infections with antibiotic usage.  Did send Diflucan. stable for discharge. Follow-up with PCP. Return to urgent care or go to the emergency department if symptoms worsen or if new symptoms develop.    Orders and Diagnoses  Diagnoses and all orders for this visit:  Acute cystitis without hematuria  -     Urine Culture  -     POCT UA Automated manually resulted  -     cephalexin (Keflex) 500 mg capsule; Take 1 capsule (500 mg) by mouth 2 times a day for 7 days.      Medical Admin Record      Patient disposition: Home    Electronically signed by Pito Long PA-C  4:47 PM           [1] (Not in a hospital admission)  [2]   Past Medical History:  Diagnosis Date    Personal history of other benign neoplasm 03/27/2021    History of prolactinoma   [3]   Past Surgical History:  Procedure Laterality Date    OTHER SURGICAL HISTORY  03/27/2021    Pituitary surgery

## 2025-05-10 NOTE — PATIENT INSTRUCTIONS
"Urinary tract infections in adults    The Basics  Written by the doctors and editors at [facility]  What is the urinary tract?  This is the group of organs in the body that handle urine (figure 1). It includes the:  ? Kidneys - These are 2 bean-shaped organs that filter the blood to make urine.  ? Bladder - This is a balloon-shaped organ that stores urine.  ? Ureters - These are 2 tubes that carry urine from the kidneys to the bladder.  ? Urethra - This is the tube that carries urine from the bladder to the outside of the body.  What are urinary tract infections?  Urinary tract infections, or \"UTIs,\" are infections that affect either the bladder or the kidneys:  ? Bladder infections are more common than kidney infections. They happen when bacteria get into the urethra and travel up into the bladder. The medical term for bladder infection is \"cystitis.\" Most of the time, when people talk about a UTI, they mean a bladder infection.  ? Kidney infections happen when the bacteria travel even higher, up into the kidneys. The medical term for kidney infection is \"pyelonephritis.\" This is more serious than a bladder infection, and can lead to other serious problems if it is not treated properly.  Both bladder and kidney infections are more common in females than males.  The risk of UTIs is also higher in people who have a urinary catheter. A catheter is a thin, flexible tube that drains urine from the bladder. It might be used in people who are in the hospital and cannot urinate the normal way.  What are the symptoms of a bladder infection?  The symptoms include:  ? Pain or a burning feeling when you urinate  ? The need to urinate often  ? The need to urinate suddenly or in a hurry  ? Blood in the urine  What are the symptoms of a kidney infection?  The symptoms of a kidney infection can include the same urinary symptoms that happen with a bladder infection.  In addition, kidney infections can cause:  ? Fever  ? Back pain  ? " Nausea or vomiting  Will I need tests?  Maybe. If you think that you might have a UTI, call your doctor or nurse. Sometimes, they can tell if you have one just by learning about your symptoms.  Your doctor or nurse might do a simple urine test in the office. They might also do a more involved urine test to check for bacteria.  How are UTIs treated?  Most are treated with antibiotic pills. These work by killing the germs that cause the infection.  ? If you have a bladder infection, you will probably need to take antibiotics for 3 to 7 days.  ? If you have a kidney infection, you will probably need to take antibiotics for longer. Some people need to take them for 10 days. If you have a kidney infection, it's also possible that you will need to be treated in the hospital.  Your symptoms should begin to improve within a day of starting antibiotics. But you should finish all of the antibiotic pills. Otherwise, the infection might come back.  If needed, you can also take a medicine to numb your bladder. This medicine eases the pain caused by UTIs. It also reduces the need to urinate.  What if I get bladder infections a lot?  First, check with your doctor or nurse to make sure that you are really having bladder infections. The symptoms of bladder infection can be caused by other things. Your doctor or nurse will want to see if those problems might be causing your symptoms.  If your doctor confirms that you are having repeated infections, there are things you can do to keep from getting more infections. These include:  ? Drinking more fluid - This can help prevent bladder infections.  ? Vaginal estrogen - If you have already been through menopause, your doctor might suggest this. Vaginal estrogen comes in a cream or a flexible ring that you put into your vagina. It can help prevent bladder infections.  Other things that might help:  ? Avoid spermicides (sperm-killing creams or gels) - Spermicide is a form of birth control.  "It seems to increase the risk of bladder infections in some females, especially when used with a diaphragm. If you use spermicide and get a lot of bladder infections, you might want to try switching to a different form of birth control.  ? Urinate right after sex - Some doctors think this helps, because it helps flush out germs that might get into the bladder during sex. There is no proof it works, but it also cannot hurt.  If you get a lot of bladder infections, and the above methods have not helped, talk to your doctor about what else you can do to prevent infection. Taking an antibiotic every day or after sex can help prevent bladder infections. But long-term use of antibiotics has downsides, so doctors usually suggest trying other things first, such as:  ? Methenamine (brand name: Hiprex) - This is a pill you take every day. It changes your urine to make it harder for bacteria to grow. It works almost as well as antibiotics to prevent bladder infections.  ? Cranberry juice or other cranberry products - These might help prevent bladder infections. Doctors do not know exactly what the best dose is. But they sometimes suggest drinking 1 or 2 glasses of cranberry juice a day to try to help prevent UTIs. You can also buy cranberry tablets.  Can other products prevent bladder infections?  People often wonder about other \"natural\" products that claim to help prevent bladder infections. These include probiotic pills, vitamin C, and D-mannose. There is not good evidence that these things work. However, there is also no clear evidence that they are harmful. If you have questions about these or other products, talk with your doctor or nurse.   "

## 2025-05-14 LAB — BACTERIA UR CULT: ABNORMAL

## 2025-05-28 ENCOUNTER — HOSPITAL ENCOUNTER (EMERGENCY)
Facility: HOSPITAL | Age: 70
Discharge: HOME | End: 2025-05-28
Payer: MEDICARE

## 2025-05-28 VITALS
WEIGHT: 155 LBS | HEIGHT: 64 IN | SYSTOLIC BLOOD PRESSURE: 116 MMHG | TEMPERATURE: 96.9 F | BODY MASS INDEX: 26.46 KG/M2 | DIASTOLIC BLOOD PRESSURE: 66 MMHG | HEART RATE: 72 BPM | OXYGEN SATURATION: 95 % | RESPIRATION RATE: 16 BRPM

## 2025-05-28 DIAGNOSIS — M54.31 SCIATICA OF RIGHT SIDE: ICD-10-CM

## 2025-05-28 DIAGNOSIS — R10.31 RIGHT GROIN PAIN: Primary | ICD-10-CM

## 2025-05-28 PROCEDURE — 96372 THER/PROPH/DIAG INJ SC/IM: CPT | Performed by: PHYSICIAN ASSISTANT

## 2025-05-28 PROCEDURE — 99284 EMERGENCY DEPT VISIT MOD MDM: CPT

## 2025-05-28 PROCEDURE — 2500000004 HC RX 250 GENERAL PHARMACY W/ HCPCS (ALT 636 FOR OP/ED): Mod: JW | Performed by: PHYSICIAN ASSISTANT

## 2025-05-28 PROCEDURE — 2500000001 HC RX 250 WO HCPCS SELF ADMINISTERED DRUGS (ALT 637 FOR MEDICARE OP): Performed by: PHYSICIAN ASSISTANT

## 2025-05-28 RX ORDER — ORPHENADRINE CITRATE 30 MG/ML
60 INJECTION INTRAMUSCULAR; INTRAVENOUS ONCE
Status: COMPLETED | OUTPATIENT
Start: 2025-05-28 | End: 2025-05-28

## 2025-05-28 RX ORDER — HYDROCODONE BITARTRATE AND ACETAMINOPHEN 7.5; 325 MG/1; MG/1
1 TABLET ORAL EVERY 6 HOURS PRN
Qty: 12 TABLET | Refills: 0 | Status: SHIPPED | OUTPATIENT
Start: 2025-05-28 | End: 2025-05-31

## 2025-05-28 RX ORDER — TIZANIDINE 4 MG/1
4 TABLET ORAL EVERY 6 HOURS PRN
Qty: 30 TABLET | Refills: 0 | Status: SHIPPED | OUTPATIENT
Start: 2025-05-28 | End: 2025-06-07

## 2025-05-28 RX ORDER — PREDNISONE 10 MG/1
TABLET ORAL
Qty: 42 TABLET | Refills: 0 | Status: SHIPPED | OUTPATIENT
Start: 2025-05-28

## 2025-05-28 RX ORDER — HYDROCODONE BITARTRATE AND ACETAMINOPHEN 5; 325 MG/1; MG/1
2 TABLET ORAL ONCE
Refills: 0 | Status: COMPLETED | OUTPATIENT
Start: 2025-05-28 | End: 2025-05-28

## 2025-05-28 RX ORDER — KETOROLAC TROMETHAMINE 30 MG/ML
15 INJECTION, SOLUTION INTRAMUSCULAR; INTRAVENOUS ONCE
Status: COMPLETED | OUTPATIENT
Start: 2025-05-28 | End: 2025-05-28

## 2025-05-28 RX ADMIN — ORPHENADRINE CITRATE 60 MG: 60 INJECTION INTRAMUSCULAR; INTRAVENOUS at 16:42

## 2025-05-28 RX ADMIN — HYDROCODONE BITARTRATE AND ACETAMINOPHEN 2 TABLET: 5; 325 TABLET ORAL at 16:42

## 2025-05-28 RX ADMIN — KETOROLAC TROMETHAMINE 15 MG: 30 INJECTION, SOLUTION INTRAMUSCULAR at 16:42

## 2025-05-28 ASSESSMENT — PAIN SCALES - GENERAL: PAINLEVEL_OUTOF10: 8

## 2025-05-28 ASSESSMENT — COLUMBIA-SUICIDE SEVERITY RATING SCALE - C-SSRS
1. IN THE PAST MONTH, HAVE YOU WISHED YOU WERE DEAD OR WISHED YOU COULD GO TO SLEEP AND NOT WAKE UP?: NO
2. HAVE YOU ACTUALLY HAD ANY THOUGHTS OF KILLING YOURSELF?: NO
6. HAVE YOU EVER DONE ANYTHING, STARTED TO DO ANYTHING, OR PREPARED TO DO ANYTHING TO END YOUR LIFE?: NO

## 2025-05-28 ASSESSMENT — PAIN DESCRIPTION - LOCATION: LOCATION: LEG

## 2025-05-28 ASSESSMENT — PAIN DESCRIPTION - ORIENTATION: ORIENTATION: RIGHT

## 2025-05-28 ASSESSMENT — PAIN DESCRIPTION - DESCRIPTORS: DESCRIPTORS: ACHING

## 2025-05-28 ASSESSMENT — PAIN - FUNCTIONAL ASSESSMENT: PAIN_FUNCTIONAL_ASSESSMENT: 0-10

## 2025-05-28 NOTE — ED PROVIDER NOTES
"HPI   Chief Complaint   Patient presents with    Leg Pain       69 y.o ,F, presents to the ED for R groin and thigh pain. Pt has additional s/s of right leg weakness, states leg feels \"heavy\". Pt states the pain has been going on for 3 months. Pt states the pain starts along the groin and goes to the proximal 1/3 medial portions of the R thigh. Pt states the pain is worse with sitting and better with standing.  Pt states she has taken ibuprofen, Tylenol, and steroids w/o relief. Pt states she has an appointment next Friday with Orthopedic Spine 6/6/25. Pt denies paresthesia, CP, SOB, fever, chills, night sweats, hemoptysis, N/V/D/C, saddle anesthesia, urinary incontinence, bowel incontinence, abdominal pain, dysuria, hematuria, abdominal pain, or HA.               Patient History   Medical History[1]  Surgical History[2]  Family History[3]  Social History[4]    Physical Exam   ED Triage Vitals [05/28/25 1502]   Temperature Heart Rate Respirations BP   36.1 °C (96.9 °F) 72 16 116/66      Pulse Ox Temp Source Heart Rate Source Patient Position   99 % Temporal -- --      BP Location FiO2 (%)     -- --       Physical Exam  Vitals and nursing note reviewed.   Constitutional:       General: She is not in acute distress.     Appearance: Normal appearance. She is normal weight. She is not ill-appearing, toxic-appearing or diaphoretic.   HENT:      Head: Normocephalic and atraumatic.      Right Ear: External ear normal.      Left Ear: External ear normal.      Nose: Nose normal.      Mouth/Throat:      Mouth: Mucous membranes are moist.      Pharynx: No oropharyngeal exudate.   Eyes:      Extraocular Movements: Extraocular movements intact.      Conjunctiva/sclera: Conjunctivae normal.      Pupils: Pupils are equal, round, and reactive to light.   Cardiovascular:      Rate and Rhythm: Normal rate and regular rhythm.   Pulmonary:      Effort: Pulmonary effort is normal. No respiratory distress.      Breath sounds: No stridor. "   Abdominal:      General: There is no distension.      Tenderness: There is no right CVA tenderness, left CVA tenderness, guarding or rebound.   Musculoskeletal:         General: Tenderness present. No swelling or deformity.      Cervical back: Normal range of motion.      Comments: R si joint, R groin tender. No deformity or swelling.      Skin:     Capillary Refill: Capillary refill takes less than 2 seconds.      Findings: No rash.   Neurological:      General: No focal deficit present.      Mental Status: She is alert and oriented to person, place, and time. Mental status is at baseline.      Cranial Nerves: No cranial nerve deficit.      Sensory: No sensory deficit.      Motor: No weakness.      Coordination: Coordination normal.   Psychiatric:         Mood and Affect: Mood normal.         Behavior: Behavior normal.         Thought Content: Thought content normal.         Judgment: Judgment normal.           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 Risk of hospitalization outweighs the benefits. Pt is resting comfortably, well  hydrated, tolerating PO, normal neuro exam, lungs CTA, ab soft NTND, not requiring supplemental O2/supportive care/IV medications or IVF, etc.   Ambulating [well/at baseline].  Do not suspect life threatening condition at this time.    Shared Decision made with pt who agrees with plan          Procedure  Procedures       [1]   Past Medical History:  Diagnosis Date    Personal history of other benign neoplasm 03/27/2021    History of prolactinoma   [2]   Past Surgical History:  Procedure Laterality Date    OTHER SURGICAL HISTORY  03/27/2021    Pituitary surgery   [3]   Family History  Problem Relation Name Age of Onset    Bone cancer Mother      Other (Cardiac disorder) Father     [4]   Social History  Tobacco Use    Smoking status: Former     Types: Cigarettes     Passive exposure: Never    Smokeless tobacco: Never   Substance Use Topics    Alcohol use: Not Currently     Alcohol/week: 1.0 standard drink of alcohol     Types: 1 Glasses of wine per week     Comment: social    Drug use: Never        Arnoldo Simms PA-C  05/28/25 2001

## 2025-06-03 ENCOUNTER — OFFICE VISIT (OUTPATIENT)
Dept: ORTHOPEDIC SURGERY | Facility: CLINIC | Age: 70
End: 2025-06-03
Payer: MEDICARE

## 2025-06-03 DIAGNOSIS — M43.16 SPONDYLOLISTHESIS OF LUMBAR REGION: ICD-10-CM

## 2025-06-03 DIAGNOSIS — M47.816 LUMBAR SPONDYLOSIS: Primary | ICD-10-CM

## 2025-06-03 DIAGNOSIS — M16.11 PRIMARY OSTEOARTHRITIS OF RIGHT HIP: ICD-10-CM

## 2025-06-03 DIAGNOSIS — M54.16 LUMBAR RADICULOPATHY: ICD-10-CM

## 2025-06-03 PROCEDURE — 1159F MED LIST DOCD IN RCRD: CPT

## 2025-06-03 PROCEDURE — 1036F TOBACCO NON-USER: CPT

## 2025-06-03 PROCEDURE — G2211 COMPLEX E/M VISIT ADD ON: HCPCS

## 2025-06-03 PROCEDURE — 99214 OFFICE O/P EST MOD 30 MIN: CPT

## 2025-06-03 PROCEDURE — 99212 OFFICE O/P EST SF 10 MIN: CPT

## 2025-06-03 RX ORDER — KETOROLAC TROMETHAMINE 10 MG/1
10 TABLET, FILM COATED ORAL EVERY 6 HOURS PRN
Qty: 15 TABLET | Refills: 0 | Status: SHIPPED | OUTPATIENT
Start: 2025-06-03 | End: 2025-06-08

## 2025-06-03 RX ORDER — GABAPENTIN 300 MG/1
300 CAPSULE ORAL 3 TIMES DAILY
Qty: 90 CAPSULE | Refills: 2 | Status: SHIPPED | OUTPATIENT
Start: 2025-06-03 | End: 2025-09-01

## 2025-06-03 NOTE — PROGRESS NOTES
HPI:  Michael Glover is a 69-year-old female who presents today with a 6-month history of worsening back pain and lumbar radicular symptoms on the right.  Her pain travels in an L4/5 distribution.  She also has groin pain which is worse with activity.  Recently had an injection in her hip which helped.  Denies left leg symptoms.  She has numbness and tingling in her right leg which comes and goes.  She has not done physical therapy.  She is on steroids, over-the-counter, and muscle relaxers.  Her symptoms are becoming intolerable.    ROS:  Reviewed on EMR and patient intake sheet.    PMH/SH:  Reviewed on EMR and patient intake sheet.    Exam:  MSK: 3/5 right hip flexion and leg extension strength, otherwise 4/5 strength of lower extremities bilaterally.  Severe pain to internal and external rotation of the right hip.  General: No acute distress. Awake and conversant.  Eyes: Normal conjunctiva, anicteric. Round symmetric pupils.  ENT: Hearing grossly intact. No nasal discharge.  Neck: Neck is supple. No masses or thyromegaly.  Respiratory: Respirations are non-labored. No wheezing.  Skin: Warm. No rashes or ulcers.  Psych: Alert and oriented. Cooperative, appropriate mood and affect, normal judgement.  CV: No lower extremity edema.  Neuro: Sensation and CN II-XII grossly normal.    Radiology:     X-rays of lumbar spine personally reviewed and demonstrates grade 1 retrolisthesis of L2 on L3.  Grade 1 anterolisthesis of L4 on L5.  Moderate multilevel spondylosis throughout.    Diagnosis:    Lumbar radiculopathy  Lumbar spondylolisthesis  Lumbar spondylosis  Right hip osteoarthritis    Assessment and Plan:  Patient was seen today and evaluated for a 6-month worsening history of right sided lumbar radicular symptoms secondary to L4/5 spondylolisthesis.  At this time, we discussed conservative management of physical therapy, pain management for injections, gabapentin, and a course of Toradol for the pain.  She was  recommended follow-up after completion of PT for reassessment.  If her symptoms are unchanged, an MRI would be appropriate.  We did discuss the differences in lumbar radicular symptoms versus groin/hip pain which was previously alleviated by injections.  Patient feels her questions were answered today.  Patient agrees with plan above.    **This note was dictated using speech recognition software and was not corrected for spelling or grammatical errors**    Deangelo Zuniga PA-C  Department of Orthopaedic Surgery  10:01 AM  06/03/25    [address] [place] [address]    Voicemail: [phone]   Appts: [phone]  Fax: [phone]

## 2025-06-11 ENCOUNTER — OFFICE VISIT (OUTPATIENT)
Dept: PAIN MEDICINE | Facility: HOSPITAL | Age: 70
End: 2025-06-11
Payer: MEDICARE

## 2025-06-11 DIAGNOSIS — Z87.898 H/O PITUITARY NEOPLASM: ICD-10-CM

## 2025-06-11 DIAGNOSIS — M54.16 LUMBAR RADICULOPATHY: Primary | ICD-10-CM

## 2025-06-11 DIAGNOSIS — R10.31 RIGHT GROIN PAIN: ICD-10-CM

## 2025-06-11 DIAGNOSIS — R29.898 RIGHT LEG WEAKNESS: ICD-10-CM

## 2025-06-11 PROCEDURE — 99204 OFFICE O/P NEW MOD 45 MIN: CPT | Performed by: PAIN MEDICINE

## 2025-06-11 PROCEDURE — 99214 OFFICE O/P EST MOD 30 MIN: CPT | Performed by: PAIN MEDICINE

## 2025-06-11 NOTE — PROGRESS NOTES
Subjective   Patient ID: Michael Glover is a 69 y.o. female with a past medical history of right lumbar radiculopathy.        HPI:   Patient reports chronic right low back pain with radiation into the right lower extremity.  She states that the pain has gotten worse recently over the last year to few months.  She states that she previously had received epidural steroid injections 15 to 20 years ago but had a posterior puncture headache requiring a blood patch and has not had an injection since.  She reports the pain is generally sharp stabbing and goes down the side of her leg and sometimes into the groin.  She takes gabapentin and Tylenol which provides very minimal relief.  She reports that the pain is worse when standing walking and transitioning from sitting to standing.  She was recently evaluated by spine surgery, but she does not wish to pursue surgery at this time.  She was referred for PT and is supposed to start in mid July. She reports right leg weakness, and numbness and tingling, and denies bowel/bladder incontinence. She was given a course of steroids that she reports provided minimal relief. She had a hip injection which she reports approximately 2 months of relief with for the hip pain.    Physical Therapy: The patient has not done physical therapy within the past six months  Other Conservative Measures she has tried: Heating Pad and Ice  Classes of medications tried in the past: Acetaminophen, NSAIDs, Gabapentenoids, and Muscle Relaxants          Review of Systems   13-point ROS done and negative except for HPI.     Current Outpatient Medications   Medication Instructions    alendronate (Fosamax) 70 mg tablet Fosamax 70 MG Oral Tablet   Refills: 0       Active    benzonatate (TESSALON) 200 mg, oral, 3 times daily PRN, Do not crush or chew.    cholecalciferol (VITAMIN D-3) 50,000 Units, Every 14 days    clobetasol (Temovate) 0.05 % ointment APPLY TO AFFECTED AREA TWICE A DAY    desmopressin (DDAVP)  0.1 mg tablet DDAVP 0.1 MG Oral Tablet   Refills: 0       Active    desonide (DesOwen) 0.05 % cream Topical, 2 times daily PRN    gabapentin (NEURONTIN) 300 mg, oral, 3 times daily    HYDROcodone-acetaminophen (Norco) 7.5-325 mg tablet 1 tablet, oral, Every 8 hours    hydrocortisone (Anusol-HC) 2.5 % rectal cream rectal, 2 times daily    hydrocortisone (Cortef) 5 mg tablet Cortef 5 MG Oral Tablet   Refills: 0       Active    levothyroxine (Synthroid, Levoxyl) 112 mcg tablet Synthroid 112 MCG Oral Tablet   Refills: 0       Active    meloxicam (Mobic) 15 mg tablet 1 tablet, Daily    naloxone (Narcan) 4 mg/0.1 mL nasal spray 0.1 mL, Once as needed    omeprazole OTC (PRILOSEC OTC) 20 mg, oral, Daily before breakfast, Do not crush, chew, or split.    predniSONE (Deltasone) 10 mg tablet 6 tabs x 2 days, 5 tabs x 2 days, 4 tabs x 2 days, 3 tabs for 2 days, 2 tabs for 2 days, 1 tab for 2 days.    tiZANidine (ZANAFLEX) 4 mg, oral, Every 6 hours PRN       Medical History[1]     Surgical History[2]     Family History[3]     RX Allergies[4]     Objective     There were no vitals filed for this visit.     Physical Exam  General: NAD, well groomed, well nourished  Eyes: Non-icteric sclera, EOMI  Ears, Nose, Mouth, and Throat: External ears and nose appear to be without deformity or rash. No lesions or masses noted. Hearing is grossly intact.   Neck: Trachea midline  Respiratory: Nonlabored breathing   Cardiovascular: no peripheral edema   Skin: No rashes or open lesions/ulcers identified on skin.    Back:   Palpation: tenderness to palpation over lumbar paraspinous muscles.   Straight leg raise: positive at 20 degrees on the right   FELISHA Maneuver does reproduce pain on the right      Neurologic:   Cranial nerves grossly intact.   Strength 4/5 right hip flexion, plantar/dorsiflexion, 5/5 left hip flexion, plantar/dorsiflexion   Sensation: Normal to light touch throughout  DTRs:normal and symmetric throughout    Psychiatric:  Alert, orientation to person, place, and time. Cooperative.    Imaging personally reviewed and independently interpreted:   X-ray lumbar spine 1/23/25 - Interval development of mild levoscoliosis centered at the  thoracolumbar junction. Grade 1 retrolisthesis at L2-3. Advanced  grade 1 anterolisthesis at L4-5 and mild grade 1 anterolisthesis at  L5-S1. Mild-to-moderate spondylosis at L1-2, L2-3, L4-5, L5-S1 with  disc height loss. Mild facet joint arthropathy from L3-4 to L5-S1.    Assessment/Plan   Michael Glover is a 69 y.o. female with a past medical history of right lumbar radiculopathy. She has significant right leg weakness and will need an MRI to evaluate.       Plan:  - MRI lumbar spine       The patient has failed treatment with : an MRI is medically necessary to diagnose their problem        Follow up: After MRI    The patient was invited to contact us back anytime with any questions or concerns and follow-up with us in the office as needed.     Diagnoses and all orders for this visit:  Lumbar radiculopathy  -     Referral to Pain Management      This note was generated with the aid of dictation software, there may be typos despite my attempts at proofreading.       Addendum:  6/12/2025  I reviewed the results of the lumbar spine MRI;  Multilevel degenerative changes within the lumbar spine, worst at  L4-L5 where there is severe narrowing of the right lateral recess, predominantly related to severe ligamentum flavum thickening with probable compromise of the descending right L5 nerve root. Associated mild spinal canal and bilateral foraminal stenosis.  2. No significant spinal canal or foraminal stenosis at the remaining levels.    Plan:  1-L4-5 interlaminar epidural steroid injection with fluoroscopy, for symptoms relief and to improve pain and facilitate ambulation and physical therapy.  2-surgical evaluation  3-if not a good surgical candidate then we will continue conservative approach, reevaluate her  after the epidural injection, and depending on her progress we may consider minimally invasive lumbar decompression, since the major reason of her severe stenosis is the severe thickening of the ligamentum flavum.    Medical necessity: The patient is presenting primarily with Lumbar pain and radicular symptoms.  The pain is constant and of moderate severity that interferes with activities of daily living and sleep. The patient failed conservative therapy to include Tylenol/NSAIDS and physical therapy/supervised home exercise program.  Lumbar spine MRI which I personally reviewed showed severe right L4-5 stenosis impingement on the right L5 nerve root.  Plan is to proceed with Lumbar Intralaminar Epidural Steroid Injection at L4-5 with fluoroscopy.  We discussed the risks, benefits and alternatives to the procedure(s) and the patient would like to proceed.  This procedure is part of a comprehensive and multimodal treatment plan to facility physical therapy and a supervised home exercise program.        [1]   Past Medical History:  Diagnosis Date    Personal history of other benign neoplasm 03/27/2021    History of prolactinoma   [2]   Past Surgical History:  Procedure Laterality Date    OTHER SURGICAL HISTORY  03/27/2021    Pituitary surgery   [3]   Family History  Problem Relation Name Age of Onset    Bone cancer Mother      Other (Cardiac disorder) Father     [4]   Allergies  Allergen Reactions    Sulfa (Sulfonamide Antibiotics) Unknown     Anxiety

## 2025-06-12 ENCOUNTER — HOSPITAL ENCOUNTER (OUTPATIENT)
Dept: RADIOLOGY | Facility: CLINIC | Age: 70
Discharge: HOME | End: 2025-06-12
Payer: MEDICARE

## 2025-06-12 DIAGNOSIS — R10.31 RIGHT GROIN PAIN: ICD-10-CM

## 2025-06-12 DIAGNOSIS — R29.898 RIGHT LEG WEAKNESS: ICD-10-CM

## 2025-06-12 DIAGNOSIS — M54.16 LUMBAR RADICULOPATHY: ICD-10-CM

## 2025-06-12 PROCEDURE — 72148 MRI LUMBAR SPINE W/O DYE: CPT

## 2025-06-13 ENCOUNTER — PREP FOR PROCEDURE (OUTPATIENT)
Dept: PAIN MEDICINE | Facility: HOSPITAL | Age: 70
End: 2025-06-13
Payer: MEDICARE

## 2025-06-13 DIAGNOSIS — M54.16 LUMBAR RADICULOPATHY: ICD-10-CM

## 2025-06-23 ENCOUNTER — TELEPHONE (OUTPATIENT)
Dept: PRIMARY CARE | Facility: CLINIC | Age: 70
End: 2025-06-23
Payer: MEDICARE

## 2025-06-23 DIAGNOSIS — G89.29 CHRONIC BILATERAL LOW BACK PAIN WITHOUT SCIATICA: ICD-10-CM

## 2025-06-23 DIAGNOSIS — M54.50 CHRONIC BILATERAL LOW BACK PAIN WITHOUT SCIATICA: ICD-10-CM

## 2025-06-23 RX ORDER — HYDROCODONE BITARTRATE AND ACETAMINOPHEN 7.5; 325 MG/1; MG/1
1 TABLET ORAL EVERY 8 HOURS
Qty: 20 TABLET | Refills: 0 | Status: SHIPPED | OUTPATIENT
Start: 2025-06-23

## 2025-06-23 NOTE — TELEPHONE ENCOUNTER
Rx Refill Request Telephone Encounter    Name:  Michael Glover  :  [id number]  Medication Name:    HYDROcodone-acetaminophen (Norco) 7.5-325 mg tablet   Specific Pharmacy location:  [facility]  Date of last appointment:  25

## 2025-06-26 ENCOUNTER — OFFICE VISIT (OUTPATIENT)
Dept: ORTHOPEDIC SURGERY | Facility: HOSPITAL | Age: 70
End: 2025-06-26
Payer: MEDICARE

## 2025-06-26 ENCOUNTER — HOSPITAL ENCOUNTER (OUTPATIENT)
Dept: RADIOLOGY | Facility: EXTERNAL LOCATION | Age: 70
Discharge: HOME | End: 2025-06-26

## 2025-06-26 VITALS — BODY MASS INDEX: 25.83 KG/M2 | WEIGHT: 155 LBS | HEIGHT: 65 IN

## 2025-06-26 DIAGNOSIS — R10.31 RIGHT GROIN PAIN: ICD-10-CM

## 2025-06-26 DIAGNOSIS — M16.11 PRIMARY OSTEOARTHRITIS OF RIGHT HIP: Primary | ICD-10-CM

## 2025-06-26 PROCEDURE — 1159F MED LIST DOCD IN RCRD: CPT | Performed by: FAMILY MEDICINE

## 2025-06-26 PROCEDURE — 3008F BODY MASS INDEX DOCD: CPT | Performed by: FAMILY MEDICINE

## 2025-06-26 PROCEDURE — 20611 DRAIN/INJ JOINT/BURSA W/US: CPT | Mod: RT | Performed by: FAMILY MEDICINE

## 2025-06-26 PROCEDURE — 1125F AMNT PAIN NOTED PAIN PRSNT: CPT | Performed by: FAMILY MEDICINE

## 2025-06-26 PROCEDURE — 99212 OFFICE O/P EST SF 10 MIN: CPT | Mod: 25 | Performed by: FAMILY MEDICINE

## 2025-06-26 PROCEDURE — 99214 OFFICE O/P EST MOD 30 MIN: CPT | Performed by: FAMILY MEDICINE

## 2025-06-26 PROCEDURE — 2500000004 HC RX 250 GENERAL PHARMACY W/ HCPCS (ALT 636 FOR OP/ED): Performed by: FAMILY MEDICINE

## 2025-06-26 RX ORDER — TRIAMCINOLONE ACETONIDE 40 MG/ML
80 INJECTION, SUSPENSION INTRA-ARTICULAR; INTRAMUSCULAR
Status: COMPLETED | OUTPATIENT
Start: 2025-06-26 | End: 2025-06-26

## 2025-06-26 RX ORDER — LIDOCAINE HYDROCHLORIDE 10 MG/ML
4 INJECTION, SOLUTION INFILTRATION; PERINEURAL
Status: COMPLETED | OUTPATIENT
Start: 2025-06-26 | End: 2025-06-26

## 2025-06-26 RX ADMIN — LIDOCAINE HYDROCHLORIDE 4 ML: 10 INJECTION, SOLUTION INFILTRATION; PERINEURAL at 10:39

## 2025-06-26 RX ADMIN — TRIAMCINOLONE ACETONIDE 80 MG: 40 INJECTION, SUSPENSION INTRA-ARTICULAR; INTRAMUSCULAR at 10:39

## 2025-06-26 ASSESSMENT — PAIN DESCRIPTION - DESCRIPTORS: DESCRIPTORS: ACHING;THROBBING;SHARP;SHOOTING

## 2025-06-26 ASSESSMENT — PAIN SCALES - GENERAL: PAINLEVEL_OUTOF10: 8

## 2025-06-26 ASSESSMENT — PAIN - FUNCTIONAL ASSESSMENT: PAIN_FUNCTIONAL_ASSESSMENT: 0-10

## 2025-06-26 NOTE — PROGRESS NOTES
** Please excuse any errors in grammar or translation related to this dictation. Voice recognition software was utilized to prepare this document. **    Assessment & Plan:  Clinical presentation is most consistent with right hip arthritis. Her current complaint of right groin pain that feels similar to the pain she had prior to her injection at our last visit in conjunction with her exam findings today are consistent with right hip arthritis. She also has low back pain and radicular pain but is being followed by pain management already for lumbar radiculopathy with plans for KEITH.    Reviewed with patient the nature of this disease and how it can be progressive. Discussed how symptoms can wax and wane in severity.  Initial non-operative treatment options reviewed below.   - Exercise program to improve muscle strength for support.  Given HEP handout previously.  - Activity modifications as needed to include use of cane/walker or braces.  - Analgesics to include but not limited to Tylenol up to 3g daily, ibuprofen up to 2.5g daily.  - Steroid injection. She had 2 months of relief from a right hip intra-articular CSI on 3/25/25.  After considering treatment options, patient elects to have a repeat right hip intra-articular CSI today, which she tolerated well.  Informed patient that steroid injection can be repeated every 3 or more months as symptoms dictate. Follow-up as needed for ongoing management.  If hip symptoms are progressively worsening consider surgical referral.    All questions answered and patient is agreeable to this plan.     Chief complaint:  Right hip pain  HPI:  6/26/25: Patient follows up for right hip pain. She reports 2 months of good pain relief from the intra-articular hip injection at our last visit 3 months ago. Her right groin then has since returned and has been worsening again, making it difficult for her to walk. She is currently taking ibuprofen and gabapentin daily. Since the last visit  she had 2 ED visits on 5/13/25 and 5/28/25 for right back and leg pain/thigh pain. She then saw Deangelo Zuniga PA-C on 6/3/25 for lumbar radiculopathy, who referred her to PT and pain management. She saw Dr. Springer with pain management on 6/11/25 and is scheduled for a L4-5 interlaminar KEITH on 7/1.    3/25/25: 69-year-old female, patient of mine seen for knee arthritis previously, presents today with right hip pain.  Pain ongoing for the last 5 to 6 months.  No injury reported.  Pain is localized to her groin and proximal anterior thigh.  She has been trying to manage her symptoms by completing therapy exercises and stretches she found online.  These helped some with mobility do not really help with her pain.  She has history of lower back pain but denies any current radicular symptoms.  No prior hip surgery reported.  No prior hip injections reported.    Patient Active Problem List   Diagnosis    Back pain    Benign head tremor    Chronic bilateral low back pain without sciatica    Dermatitis    Diabetes insipidus    GERD (gastroesophageal reflux disease)    Headache    Hypopituitarism (Multi)    Left shoulder pain    Lump, breast    Opioid use    Osteoporosis    Pain of both thumbs    Pelvic pain in female    Rectal bleeding    Right foot pain    Skin irritation    Strain of lumbar region    Swelling of thigh    Cough in adult    Lt flank pain    Pain of left thumb    Pain of right thumb    Renal insufficiency    Skin lesion of face    Cervical spine pain    Stiffness of cervical spine     Past Surgical History:   Procedure Laterality Date    OTHER SURGICAL HISTORY  03/27/2021    Pituitary surgery     Current Outpatient Medications on File Prior to Visit   Medication Sig Dispense Refill    alendronate (Fosamax) 70 mg tablet Fosamax 70 MG Oral Tablet   Refills: 0       Active      benzonatate (Tessalon) 200 mg capsule Take 1 capsule (200 mg) by mouth 3 times a day as needed for cough. Do not crush or chew. 30  capsule 0    cholecalciferol (Vitamin D-3) 50,000 unit capsule Take 1 capsule (50,000 Units) by mouth every 14 (fourteen) days.      clobetasol (Temovate) 0.05 % ointment APPLY TO AFFECTED AREA TWICE A DAY      desmopressin (DDAVP) 0.1 mg tablet DDAVP 0.1 MG Oral Tablet   Refills: 0       Active      desonide (DesOwen) 0.05 % cream Apply topically 2 times a day as needed for irritation. 60 g 0    gabapentin (Neurontin) 300 mg capsule Take 1 capsule (300 mg) by mouth 3 times a day. 90 capsule 2    HYDROcodone-acetaminophen (Norco) 7.5-325 mg tablet Take 1 tablet by mouth every 8 hours. 20 tablet 0    hydrocortisone (Anusol-HC) 2.5 % rectal cream Insert into the rectum 2 times a day for 5 days. 30 g 0    hydrocortisone (Cortef) 5 mg tablet Cortef 5 MG Oral Tablet   Refills: 0       Active      levothyroxine (Synthroid, Levoxyl) 112 mcg tablet Synthroid 112 MCG Oral Tablet   Refills: 0       Active      meloxicam (Mobic) 15 mg tablet Take 1 tablet (15 mg) by mouth once daily.      naloxone (Narcan) 4 mg/0.1 mL nasal spray Administer 1 spray (4 mg) into affected nostril(s) 1 time if needed.      omeprazole OTC (PriLOSEC OTC) 20 mg EC tablet Take 1 tablet (20 mg) by mouth once daily in the morning. Take before meals. Do not crush, chew, or split. 90 tablet     predniSONE (Deltasone) 10 mg tablet 6 tabs x 2 days, 5 tabs x 2 days, 4 tabs x 2 days, 3 tabs for 2 days, 2 tabs for 2 days, 1 tab for 2 days. 42 tablet 0    tiZANidine (Zanaflex) 4 mg tablet Take 1 tablet (4 mg) by mouth every 6 hours if needed for muscle spasms for up to 10 days. 30 tablet 0    [DISCONTINUED] HYDROcodone-acetaminophen (Norco) 7.5-325 mg tablet Take 1 tablet by mouth every 8 hours. 20 tablet 0     No current facility-administered medications on file prior to visit.     Exam:  General Exam:  Constitutional - NAD, AAO x 3, conversing appropriately.  HEENT- Normocephalic and atraumatic. No facial deformities. Hearing grossly normal.  Lungs -  Breathing non-labored with normal rate. No accessory muscle use.  CV - Extremities warm and well-perfused, brisk capillary refill present.   Neuro - CN II-XII grossly intact.    Right hip Exam:  Mild antalgic gait  No warmth, erythema or ecchymosis overlying.  Active flexion >90 degrees.  IR 30 deg, ER 50 deg.  NTTP over greater trochanter, glute tendons, proximal ITB, ischial tuberosity  5/5 strength of hip flexion, abduction, & adduction  SILT  Positive log roll pain, FADIR, FELISHA (eliciting groin pain), Stinchfield, and Scour  Negative resisted external derotation test    Results:  Xrays of right hip obtained 3/25/2025 : mild degenerative changes with joint space narrowing and subchondral sclerosis.     Lab Results   Component Value Date    HGBA1C 5.2 12/20/2022    HGBA1C 5.0 12/16/2022    CREATININE 1.03 02/23/2023       Procedure:  Patient ID: Michael Glover is a 69 y.o. female.    L Inj/Asp: R hip joint on 6/26/2025 10:39 AM  Indications: pain  Details: 22 G (spinal) needle, ultrasound-guided anterior approach  Medications: 80 mg triamcinolone acetonide 40 mg/mL; 4 mL lidocaine 10 mg/mL (1 %)  Outcome: tolerated well, no immediate complications    Procedure risk factors to include increased pain, infection, bleeding, neurovascular injury particularly given close proximity to femoral vessels, soft tissue injury, progressive cartilage loss, transient elevation of blood glucose and blood pressure, and adverse reaction to medication were discussed with the patient. Patient understands there is a moderate risk of morbidity from undergoing the procedure.    Procedure, treatment alternatives, risks and benefits explained, specific risks discussed. Consent was given by the patient. Immediately prior to procedure a time out was called to verify the correct patient, procedure, equipment, support staff and site/side marked as required. Patient was prepped and draped in the usual sterile fashion.

## 2025-07-01 ENCOUNTER — HOSPITAL ENCOUNTER (OUTPATIENT)
Facility: HOSPITAL | Age: 70
Discharge: HOME | End: 2025-07-01
Payer: MEDICARE

## 2025-07-01 VITALS
TEMPERATURE: 97.3 F | DIASTOLIC BLOOD PRESSURE: 82 MMHG | RESPIRATION RATE: 16 BRPM | SYSTOLIC BLOOD PRESSURE: 127 MMHG | HEART RATE: 69 BPM | OXYGEN SATURATION: 94 %

## 2025-07-01 DIAGNOSIS — M54.16 LUMBAR RADICULOPATHY: ICD-10-CM

## 2025-07-01 PROCEDURE — 2550000001 HC RX 255 CONTRASTS: Performed by: PAIN MEDICINE

## 2025-07-01 PROCEDURE — 62323 NJX INTERLAMINAR LMBR/SAC: CPT | Performed by: PAIN MEDICINE

## 2025-07-01 PROCEDURE — 2500000004 HC RX 250 GENERAL PHARMACY W/ HCPCS (ALT 636 FOR OP/ED): Performed by: PAIN MEDICINE

## 2025-07-01 PROCEDURE — 7100000010 HC PHASE TWO TIME - EACH INCREMENTAL 1 MINUTE

## 2025-07-01 PROCEDURE — 7100000009 HC PHASE TWO TIME - INITIAL BASE CHARGE

## 2025-07-01 RX ORDER — DEXAMETHASONE SODIUM PHOSPHATE 10 MG/ML
INJECTION INTRAMUSCULAR; INTRAVENOUS
Status: COMPLETED | OUTPATIENT
Start: 2025-07-01 | End: 2025-07-01

## 2025-07-01 RX ORDER — FENTANYL CITRATE 50 UG/ML
INJECTION, SOLUTION INTRAMUSCULAR; INTRAVENOUS
Status: COMPLETED | OUTPATIENT
Start: 2025-07-01 | End: 2025-07-01

## 2025-07-01 RX ORDER — MIDAZOLAM HYDROCHLORIDE 1 MG/ML
INJECTION, SOLUTION INTRAMUSCULAR; INTRAVENOUS
Status: COMPLETED | OUTPATIENT
Start: 2025-07-01 | End: 2025-07-01

## 2025-07-01 RX ORDER — LIDOCAINE HYDROCHLORIDE 5 MG/ML
INJECTION, SOLUTION INFILTRATION; INTRAVENOUS
Status: COMPLETED | OUTPATIENT
Start: 2025-07-01 | End: 2025-07-01

## 2025-07-01 RX ADMIN — FENTANYL CITRATE 50 MCG: 50 INJECTION, SOLUTION INTRAMUSCULAR; INTRAVENOUS at 10:12

## 2025-07-01 RX ADMIN — DEXAMETHASONE SODIUM PHOSPHATE 10 MG: 10 INJECTION, SOLUTION INTRAMUSCULAR; INTRAVENOUS at 10:17

## 2025-07-01 RX ADMIN — IOHEXOL 1 ML: 240 INJECTION, SOLUTION INTRATHECAL; INTRAVASCULAR; INTRAVENOUS; ORAL at 10:16

## 2025-07-01 RX ADMIN — LIDOCAINE HYDROCHLORIDE 7 ML: 5 INJECTION, SOLUTION INFILTRATION at 10:16

## 2025-07-01 RX ADMIN — MIDAZOLAM 1 MG: 1 INJECTION INTRAMUSCULAR; INTRAVENOUS at 10:12

## 2025-07-01 ASSESSMENT — PAIN - FUNCTIONAL ASSESSMENT
PAIN_FUNCTIONAL_ASSESSMENT: 0-10
PAIN_FUNCTIONAL_ASSESSMENT: 0-10
PAIN_FUNCTIONAL_ASSESSMENT: WONG-BAKER FACES
PAIN_FUNCTIONAL_ASSESSMENT: WONG-BAKER FACES

## 2025-07-01 ASSESSMENT — PAIN SCALES - GENERAL
PAINLEVEL_OUTOF10: 5 - MODERATE PAIN
PAINLEVEL_OUTOF10: 8
PAINLEVEL_OUTOF10: 0 - NO PAIN
PAINLEVEL_OUTOF10: 5 - MODERATE PAIN

## 2025-07-01 NOTE — H&P
Pain Management H&P    History Of Present Illness  Michael Glover is a 69 y.o. female presents for procedure state below. Endorses no changes in past medical history or medical health since last seen in clinic.      ASA: 2  Mallampati: 2    Past Medical History  She has a past medical history of Personal history of other benign neoplasm (03/27/2021).    Surgical History  She has a past surgical history that includes Other surgical history (03/27/2021).     Social History  She reports that she has quit smoking. Her smoking use included cigarettes. She has never been exposed to tobacco smoke. She has never used smokeless tobacco. She reports that she does not currently use alcohol after a past usage of about 1.0 standard drink of alcohol per week. She reports that she does not use drugs.    Family History  Family History[1]     Allergies  Sulfa (sulfonamide antibiotics)    Review of Symptoms:   Constitutional: Negative for chills, diaphoresis or fever  HENT: Negative for neck swelling  Eyes:.  Negative for eye pain  Respiratory:.  Negative for cough, shortness of breath or wheezing    Cardiovascular:.  Negative for chest pain or palpitations  Gastrointestinal:.  Negative for abdominal pain, nausea and vomiting  Genitourinary:.  Negative for urgency  Musculoskeletal:  Positive for back pain. Positive for joint pain. Denies falls within the past 3 months.  Skin: Negative for wounds or itching   Neurological: Negative for dizziness, seizures, loss of consciousness and weakness  Endo/Heme/Allergies: Does not bruise/bleed easily  Psychiatric/Behavioral: Negative for depression. The patient does not appear anxious.       PHYSICAL EXAM  Vitals signs reviewed  Constitutional:       General: Not in acute distress     Appearance: Normal appearance. Not ill-appearing.  HENT:     Head: Normocephalic and atraumatic  Eyes:     Conjunctiva/sclera: Conjunctivae normal  Cardiovascular:     Rate and Rhythm: Normal rate and regular  rhythm  Pulmonary:     Effort: No respiratory distress  Abdominal:     Palpations: Abdomen is soft  Musculoskeletal: YEN  Skin:     General: Skin is warm and dry  Neurological:     General: No focal deficit present  Psychiatric:         Mood and Affect: Mood normal         Behavior: Behavior normal     Last Recorded Vitals  There were no vitals taken for this visit.    Relevant Results  Current Outpatient Medications   Medication Instructions    alendronate (Fosamax) 70 mg tablet Fosamax 70 MG Oral Tablet   Refills: 0       Active    benzonatate (TESSALON) 200 mg, oral, 3 times daily PRN, Do not crush or chew.    cholecalciferol (VITAMIN D-3) 50,000 Units, Every 14 days    clobetasol (Temovate) 0.05 % ointment APPLY TO AFFECTED AREA TWICE A DAY    desmopressin (DDAVP) 0.1 mg tablet DDAVP 0.1 MG Oral Tablet   Refills: 0       Active    desonide (DesOwen) 0.05 % cream Topical, 2 times daily PRN    gabapentin (NEURONTIN) 300 mg, oral, 3 times daily    HYDROcodone-acetaminophen (Norco) 7.5-325 mg tablet 1 tablet, oral, Every 8 hours    hydrocortisone (Anusol-HC) 2.5 % rectal cream rectal, 2 times daily    hydrocortisone (Cortef) 5 mg tablet Cortef 5 MG Oral Tablet   Refills: 0       Active    levothyroxine (Synthroid, Levoxyl) 112 mcg tablet Synthroid 112 MCG Oral Tablet   Refills: 0       Active    meloxicam (Mobic) 15 mg tablet 1 tablet, Daily    naloxone (Narcan) 4 mg/0.1 mL nasal spray 0.1 mL, Once as needed    omeprazole OTC (PRILOSEC OTC) 20 mg, oral, Daily before breakfast, Do not crush, chew, or split.    predniSONE (Deltasone) 10 mg tablet 6 tabs x 2 days, 5 tabs x 2 days, 4 tabs x 2 days, 3 tabs for 2 days, 2 tabs for 2 days, 1 tab for 2 days.    tiZANidine (ZANAFLEX) 4 mg, oral, Every 6 hours PRN         MR lumbar spine wo IV contrast 06/12/2025    Narrative  Interpreted By:  Shanice Meier,  STUDY:  MR LUMBAR SPINE WO IV CONTRAST;  6/12/2025 8:18 am    INDICATION:  Signs/Symptoms:right leg  weakness.    COMPARISON:  Lumbar radiographs 2025. MRI lumbar spine 2025.    ACCESSION NUMBER(S):  [id number]    ORDERING CLINICIAN:  DARIEL PALMA    TECHNIQUE:  Multi planar, multisequence images of the lumbar spine were acquired.    FINDINGS:  NUMBERIN lumbar-type vertebral bodies; the last well-formed disc  space is labeled L5-S1.    ALIGNMENT: Grade 1 anterolisthesis of L4-L5. Slight anterolisthesis  of T11-T12.    VERTEBRAE: Vertebral body heights are maintained. No suspicious  osseous lesions.    CONUS: The lower thoracic cord appears unremarkable. The conus  terminates at . The cauda equina is unremarkable.    SOFT TISSUES: Unremarkable.    DISC LEVELS:    T12-L1: Mild disc bulge. No spinal canal or foraminal narrowing.    L1-2: Mild bilateral facet arthropathy. No spinal canal or foraminal  narrowing.    L2-3: Disc bulge, bilateral facet arthropathy and ligamentum flavum  thickening. No spinal canal or foraminal narrowing.    L3-4: Disc bulge, bilateral facet arthropathy and ligamentum flavum  thickening. No spinal canal or foraminal narrowing.    L4-5: Grade 1 anterolisthesis. Disc bulge/uncovering of the disc,  bilateral facet arthropathy and severe ligamentum flavum thickening.  Severe narrowing of the right lateral recess with probable compromise  of the descending right L5 nerve root. Mild spinal canal stenosis.  Mild bilateral foraminal stenosis.    L5-S1: Mild disc bulge, bilateral facet arthropathy. No spinal canal  or foraminal narrowing.    OTHER: None.    Impression  1. Multilevel degenerative changes within the lumbar spine, worst at  L4-L5 where there is severe narrowing of the right lateral recess,  predominantly related to severe ligamentum flavum thickening with  probable compromise of the descending right L5 nerve root. Associated  mild spinal canal and bilateral foraminal stenosis.  2. No significant spinal canal or foraminal stenosis at the remaining  levels.    I  personally reviewed the images/study and I agree with the findings  as stated.    MACRO:  None    Signed by: Shanice Meier 6/12/2025 8:49 AM  Dictation workstation:   [id number]      XR hip right with pelvis when performed 2 or 3 views 03/25/2025    Narrative  Interpreted By:  Sophie Velasco,  STUDY:  XR HIP RIGHT WITH PELVIS WHEN PERFORMED 2 OR 3 VIEWS; ;  3/25/2025  9:13 am    INDICATION:  Signs/Symptoms:pain in right hip.    ,M25.551 Pain in right hip    COMPARISON:  None.    ACCESSION NUMBER(S):  [id number]    ORDERING CLINICIAN:  YANIV COOK    FINDINGS:  Single view pelvis and two views of right hip were performed.    There is no acute fracture or dislocation. Pelvic ring is intact.  Bilateral hip joints are intact and demonstrate mild degenerative  changes with joint space narrowing and small marginal osteophytes..  Bilateral sacroiliac joints are intact and demonstrate mild  degenerative changes. There are mild degenerative changes in the  pubic symphysis with subchondral sclerosis and small marginal  osteophytes. Partially included lower lumbar spine demonstrates mild  degenerative changes. No obvious soft tissue abnormality.    Impression  Mild bilateral hip arthrosis. Mild arthrosis in bilateral sacroiliac  joints and pubic symphysis.      MACRO:  None    Signed by: Sophie Velasco 3/26/2025 12:51 PM  Dictation workstation:   [id number]      XR hip right with pelvis when performed 2 or 3 views 05/07/2024    Narrative  Interpreted By:  Malu Ames,  STUDY:  Right hip, two views.    INDICATION:  Signs/Symptoms:groin pain.    COMPARISON:  None.    ACCESSION NUMBER(S):  [id number]    ORDERING CLINICIAN:  LALITO FERNANDES    FINDINGS:  No acute fracture or malalignment.  Right hip joint space is well preserved.  Soft tissues are within normal limits.    Impression  1. Normal radiographic evaluation of right hip.    MACRO:    None.    Signed by: Malu Ames 5/11/2024 7:50 AM  Dictation workstation:    [id number]     No image results found.       1. Lumbar radiculopathy  FL pain management    FL pain management    Epidural Steroid Injection    Epidural Steroid Injection           ASSESSMENT/PLAN  Michael Glover is a 69 y.o. female presenting for L4-L5 lumbar interlaminar epidural steroid injection     Patient denies any recent antibiotic use or infections, denies any blood thinner use, and denies contrast or local anesthetic allergies     Risks, benefits, alternatives discussed. All questions answered to the best of my ability. Patient agrees to proceed.      Our plan is as follows:  - Proceed with aforementioned procedure          Davon Mendez MD   Pain fellow          [1]   Family History  Problem Relation Name Age of Onset    Bone cancer Mother      Other (Cardiac disorder) Father

## 2025-07-01 NOTE — DISCHARGE INSTRUCTIONS
DISCHARGE INSTRUCTIONS FOR INJECTIONS     You underwent lumbar interlaminar epidural steroid injection today    After most injections, it is recommended that you relax and limit your activity for the remainder of the day unless you have been told otherwise by your pain physician.  You should not drive a car, operate machinery, or make important legal decisions unless otherwise directed by your pain physician.  You may resume your normal activity, including exercise, tomorrow.      Keep a written pain diary of how much pain relief you experienced following the injection procedure and the length of time of pain relief you experienced pain relief. Following diagnostic injections like medial branch nerve blocks, sacroiliac joint blocks, stellate ganglion injections and other blocks, it is very important you record the specific amount of pain relief you experienced immediately after the injectionand how long it lasted. Your doctor will ask you for this information at your follow up visit.     For all injections, please keep the injection site dry and inspect the site for a couple of days. You may remove the Band-Aid the day of the injection at any time.     Some discomfort, bruising or slight swelling may occur at the injection site. This is not abnormal if it occurs.  If needed you may:    -Take over the counter medication such as Tylenol or Motrin.   -Apply an ice pack for 30 minutes, 2 to 3 times a day for the first 24 hours.     You may shower today; no soaking baths, hot tubs, whirlpools or swimming pools for two days.      If you are given steroids in your injection, it may take 3-5 days for the steroid medication to take effect. You may notice a worsening of your symptoms for 1-2 days after the injection. This is not abnormal.  You may use acetaminophen, ibuprofen, or prescription medication that your doctor may have prescribed for you if you need to do so.     A few common side effects of steroids include facial  flushing, sweating, restlessness, irritability,difficulty sleeping, increase in blood sugar, and increased blood pressure. If you have diabetes, please monitor your blood sugar at least once a day for at least 5 days. If you have poorly controlled high blood pressure, monitoryour blood pressure for at least 2 days and contact your primary care physician if these numbers are unusually high for you.      If you take aspirin or non-steroidal anti-inflammatory drugs (examples are Motrin, Advil, ibuprofen, Naprosyn, Voltaren, Relafen, etc.) you may restart these this evening, but stop taking it 3 days before your next appointment, unless instructed otherwiseby your physician.      You do not need to discontinue non-aspirin-containing pain medications prior to an injection (examples: Celebrex, tramadol, hydrocodone and acetaminophen).      If you take a blood thinning medication (Coumadin, Lovenox, Fragmin,Ticlid, Plavix, Pradaxa, etc.), please discuss this with your primary care physician/cardiologist and your pain physician. These medications MUST be discontinued before you can have an injection safely, without the risk of uncontrolled bleeding. If these medications are not discontinued for an appropriate period of time, you will not be able to receivean injection. Please adhere to instructions given to you about when to restart your blood thinning medication. If you have any questions please reach out to our team.    If you are taking Coumadin, please have your INR checked the morning of your procedure and bring the result to your appointment unless otherwise instructed. If your INR is over 1.2, your injection may need to be rescheduled to avoid uncontrolled bleeding from the needle placement.     Call [facility]  and ask for Pain Management at [phone] between 8am-4pm Monday - Friday if you are experiencing the following:    If you received an epidural or spinal injection:    -Headache that doesnot go away with  medicine, is worse when sitting or standing up, and is greatly relieved upon lying down.   -Severe pain worse than or different than your baseline pain.   -Chills or fever (101º F or greater).   -Drainage or signs of infection at the injection site     Go directly to the Emergency Department if you are experiencing the following and received an epidural or spinal injection:   -Abrupt weakness or progressive weakness in your legs that starts after you leave the clinic.   -Abrupt severe or worsening numbness in your legs.   -Inability to urinate after the injection or loss of bowel or bladder control without the urge to defecate or urinate.     If you have a clinical question that cannot wait until your next appointment, please call [phone] between 8am-4pm Monday - Friday or send a [employer] message. We do our best to return all non-emergency messages within 24 hours, Monday - Friday. A nurse or physician will return your message. You may also try calling and they will do their best to answer your question(s):  - Dr. Arslan Coulter's nurse ([phone])  - Dr. Esperanza Melendez/Dr. Pineda's nurse ([phone])  - Dr. Gian Stone/Tomer's nurse ([phone])     If you need to cancel an appointment, please call the scheduling staff at [phone] during normal business hours or leave a message at least 24 hours in advance.     If you are going to be sedated for your next procedure, you MUST have responsible adult who can legally drive accompany you home. You cannot eat or drink for at least eight hours prior to the planned procedure if you are going to receive sedation. You may take your non-blood thinning medications with a small sip of water.

## 2025-07-15 ENCOUNTER — EVALUATION (OUTPATIENT)
Dept: PHYSICAL THERAPY | Facility: CLINIC | Age: 70
End: 2025-07-15
Payer: MEDICARE

## 2025-07-15 DIAGNOSIS — M25.551 RIGHT HIP PAIN: ICD-10-CM

## 2025-07-15 DIAGNOSIS — M54.16 LUMBAR RADICULOPATHY: Primary | ICD-10-CM

## 2025-07-15 DIAGNOSIS — M62.81 MUSCLE WEAKNESS ON EXAMINATION: ICD-10-CM

## 2025-07-15 PROCEDURE — 97110 THERAPEUTIC EXERCISES: CPT | Mod: GP

## 2025-07-15 PROCEDURE — 97161 PT EVAL LOW COMPLEX 20 MIN: CPT | Mod: GP

## 2025-07-15 NOTE — PROGRESS NOTES
Physical Therapy  Physical Therapy Orthopedic Evaluation    Patient Name: Michael Glover  MRN: [id number]  Today's Date: 7/15/2025    Insurance:  Visit number: 1 of [place]  Authorization info: Yes  Insurance Type: Payor: [employer] / Plan: [employer] MEDICARE ADVANTAGE / Product Type: *No Product type* /    Current Problem  Problem List Items Addressed This Visit           ICD-10-CM    Right hip pain M25.551    Relevant Orders    Follow Up In Physical Therapy    Muscle weakness on examination M62.81    Relevant Orders    Follow Up In Physical Therapy     Other Visit Diagnoses         Codes      Lumbar radiculopathy    -  Primary M54.16    Relevant Orders    Follow Up In Physical Therapy          Referred by: VIRIDIANA Palma  General:   Lumbar radiculopathy  Right hip pain  Precautions:   OA right hip  Previous knee pain  Medical History Form: Reviewed (scanned into chart)    Subjective:   Subjective   Chief Complaint: Pt is a 70 yo female who presents to our office today with > 6 months history of worsening back pain and lumbar radiculopathy symptoms down the right LE.  She also has groin pain that increases with activity. She did have an injection in the right hip that helped with pain.  She underwent lumbar interlaminar epidural steroid injection on 7/1/25.  Pt reports that since the injection she no longer having the pain down the legs.  She reports more right upper flank pain pain that will travel to the thigh.  She reports that hip/groin pain is now her number concern.  She reports that she will get pulling and twisting in the hip.  She was last injected in the right hip 6/26/25 which gave her minimal relief.      Pain in the right hip:  lifting the hip, cross the hip midline, sitting for long periods of time, walking distance limited, stairs ( has to go down backwards)    Onset: > 6 months  FERNIE: unknown     Current Condition:   Better in the back and same/worst in the hip     Rehab Fall Risk: Low: Age 65 or  older    This patient is identified as a low fall risk based on the highest level factors present.    Outpatient Rehab Fall Risk Interventions:  Low Fall Risk Interventions: Low Fall Risk Interventions: Fall prevention education provided and Optimize clinic environment ensuring safe and accessible pathways   Pain:     Current: 5 At Worst: 10  Location: right groin, right lower flank  Description: intermittent and sharp, constant ache  Aggravating Factors: see above   Relieving Factors:  Rest and Ice, muscle rub, motrin    Relevant Information (PMH & Previous Tests/Imaging): 1. Multilevel degenerative changes within the lumbar spine, worst at  L4-L5 where there is severe narrowing of the right lateral recess,  predominantly related to severe ligamentum flavum thickening with  probable compromise of the descending right L5 nerve root. Associated  mild spinal canal and bilateral foraminal stenosis.  2. No significant spinal canal or foraminal stenosis at the remaining  levels.    Mild bilateral hip arthrosis. Mild arthrosis in bilateral sacroiliac  joints and pubic symphysis.  Previous Interventions/Treatments: None    Prior Level of Function (PLOF)  Patient previously independent with all ADLs  Exercise/Physical Activity: gym, TDM, stepper  Work/School: Retired    Patients Living Environment: Reviewed and no concern  Primary Language: English  Patient's Goal(s) for Therapy: decrease pain    Red Flags: Do you have any of the following? No  Fever/chills, unexplained weight changes, dizziness/fainting, unexplained change in bowel or bladder functions, unexplained malaise or muscle weakness, night pain/sweats, numbness or tingling    Objective       Posture: guarded with movements at the hip     Lumbar flexion -2 in from the floor, no increase in pain in the hip or flank  Ext 15% limited   Right SB mid knee   Left SB mid knee   Right ROT 20% limited   Left ROT 20% limited     LE hip mobility very limited in mobility of  the right hip in all planes,   PROM guarding and high reactivity, 85 degrees   Hip extension right able to get to neutral but effort in standing  Hip IR; high reactivity, 10 degrees   Hip ER:  moderate reactivity 10 degrees      + FADIR  + flexion/adduction screen, unable to even get into quadrant 2     Right hip flexion WFL, but increases tension on the right side  Hip ER: 25 degrees   Hip IR 15 degrees         Hip flexion Right 3-/5 * painful left 3+/5  Hip abd:  3/5 * left 3+/5   Hip add: 3/5* left 4-/5  Knee ext:  4/5 left 4/5      LE knee mobility WFL right and left side    Pain to palpation over the adductors, greater trochanter, and right deep glute         Lower Extremity Functional Movements  Transfers: moderate use of UE for sit to stand transfer, needs UE to assist  Gait: antalgic gait, decrease in stance time on the right compared to the left, increase in lateral sway,  SL Balance: unable to perform on the right  Gait with SPC in the left hand:  did help with overall irritability in the right hip           Outcome Measures:  Other Measures  Oswestry Disablity Index (VIPIN): 17     Treatments:  Access Code: [email or web address]  URL: [email or web address]  Date: 07/15/2025  Prepared by: Queenie Armstrong    Exercises  - Caregiver PROM Hip Circumduction at Neutral  - 3-4 x daily - 7 x weekly - 3 sets - 10 reps  - Supine Single Knee to Chest Stretch  - 3 x daily - 7 x weekly - 1 sets - 10 reps - 10 hold  - Supine Gluteal Sets  - 5 x daily - 74-5 x weekly - 2-3 sets - 20 reps - 5 hold  - Supine Posterior Pelvic Tilt  - 10 x daily - 7 x weekly - 1 sets - 20 reps - 5 hold  - Bent Knee Fallouts  - 2 x daily - 7 x weekly - 2 sets - 10 reps  - Supine Quad Set on Towel Roll  - 10 x daily - 4-5 x weekly - 1 sets - 20 reps - 5 hold  - Hooklying Isometric Hip Abduction with Belt  - 1 x daily - 4-5 x weekly - 2 sets - 10 reps - 5 hold  - Supine Hip Adduction Isometric with Ball  - 1 x daily - 4-5 x weekly - 2  sets - 10 reps - 5 hold  - Standing Lumbar Extension with Counter  - 3-5 x daily - 10 reps    EDUCATION: Home exercise program, plan of care, activity modifications, pain management, and injury pathology  Outpatient Education  Individual(s) Educated: Patient  Education Provided: Anatomy, Body Mechanics, Fall Risk, Home Exercise Program, Physiology, POC, Posture  Risk and Benefits Discussed with Patient/Caregiver/Other: yes  Patient/Caregiver Demonstrated Understanding: yes  Plan of Care Discussed and Agreed Upon: yes    Assessment: Patient presents with signs and symptoms consistent with lumbar radiculopathy and right hip pain related to degenerative changes/OA, resulting in limited participation in pain-free ADLs and inability to perform at their prior level of function. Pt would benefit from physical therapy to address the impairments found & listed previously in the objective section in order to return to safe and pain-free ADLs and prior level of function.  Secondary to patients high reactivity and irritability in the right hip and minimal relief after the last injection into the hip I recommend that patient follow back up with ortho to discuss referral to determine if she is a candidate for other interventions including THR.  Pt activity levels, lifestyle, and all ADL's are being effected by her current pain and functional status in the right hip.   Evolving with changing characteristics    PT Assessment Results: Decreased strength, Decreased range of motion, Decreased endurance, Impaired balance, Decreased mobility, Decreased coordination, Pain  Rehab Prognosis: Good (good for the low back, fair for the right hip)    Plan:  Certification Period Start Date: 07/15/25  Certification Period End Date: 09/13/25  Plan of Care Agreement: Patient  Planned Interventions include: therapeutic exercise, self-care home management, manual therapy, therapeutic activities, gait training, neuromuscular coordination,  vasopneumatic, dry needling, aquatic therapy  Frequency: 1 x Week  Duration: 8 Weeks  Goals: Set and discussed today    Short Term Goals  1.  The patient will report a decrease in pain at best to 3/10 and at worst to 5/10 to demonstrate improvement in quality of life in 4 weeks.   2.  Pt will improve core/trunk stability to perform 10 bridges without any increase in low back pain and improve glute strength by 4 weeks.   3.  Patient  will report a decrease in right leg radicular symptoms  into the right lateral thigh by > 25%.     Long Term goals   The patient will report a decrease in pain at best to 2/10 and at worst to 4/10 to demonstrate improvement in quality of life in 8 weeks.  The patient will demonstrate an increase in MMT to 4-/5 grossly in 8 weeks to improve ability to perform functional transfers and stair climbing.   The patient will report an decrease of their VIPIN functional outcome score of 10 points. Demonstrating a clinically significant change (MCID 10).   Pt will improve functional leg strength to ambulate in the community for >10 min with least restrictive adaptive device without increase in irritability.  Pt will report a decrease in right radicular symptoms by > 50% into the right upper thigh  Patient  will report full independence with HEP     Plan of care was developed with input and agreement by the patient  Ambulatory Screenings Summary       Screening  Frequency  Date Last Completed   Spiritual and Cultural Beliefs   Screening  each visit or episode of care 4/28/2023   Falls Risk Screening  every ambulatory visit    Pain Screening  annually at primary care visit  7/31/2023   Domestic Violence screening  annually at primary care visit 4/28/2023   Elder Abuse Screening  annually at primary care visit    Depression Screening  annually in the primary care setting 2/18/2025   Suicide Risk Screening  annually in the primary care setting 7/1/2025   Nutrition and Food Insecurity   Screening  at  least annually at primary care visit     Key Learner  annually in the primary care setting 2023   Drug Screen  2025  9:55 AM   Alcohol Screen  2025  9:55 AM   Advance Directive  2023         Insurance Authorization Information  Date of Evaluation: 7/15/25    Onset Date: 6/3/2025    Referring Physician: Deangelo Zuniga     Surgery in the Last 3 months:  no    CPT Codes: Therapeutic Exercise: 55055 Therapeutic Activity: 95359 Neuromuscular Re-Education: 23658 Manual Therapy: 08678 Gait Trainin Self-Care/Home Management Trainin Mechanical Traction: 82647 Electric Stimulation (Attended): 97636 Electric Stimulation (Unattended): 21274 Vasopneumatic Device: 26516 PT Re-Evaluation: 60635     Diagnosis:   Problem List Items Addressed This Visit           ICD-10-CM    Right hip pain M25.551    Relevant Orders    Follow Up In Physical Therapy    Muscle weakness on examination M62.81    Relevant Orders    Follow Up In Physical Therapy     Other Visit Diagnoses         Codes      Lumbar radiculopathy    -  Primary M54.16    Relevant Orders    Follow Up In Physical Therapy               Functional Outcome:  Other Measures  Oswestry Disablity Index (VIPIN): 17    OT / PT Evaluation complexity:  low    Which of the following best describes the primary reason of therapy: Improving, restoring, or adapting functional mobility or skills    Visits Requested: 6    Date Range: 90 days    Select all conditions that apply: Additional body part/ new conditions and Arthritis Conditions       Queenie Armstrong, PT        Time Calculation  Start Time: 1515  Stop Time: 1600  Time Calculation (min): 45 min  PT Evaluation Time Entry  PT Evaluation (Low) Time Entry: 22 PT Therapeutic Procedures Time Entry  Therapeutic Exercise Time Entry: 10

## 2025-07-18 ENCOUNTER — TELEPHONE (OUTPATIENT)
Dept: ORTHOPEDIC SURGERY | Facility: HOSPITAL | Age: 70
End: 2025-07-18
Payer: MEDICARE

## 2025-07-18 DIAGNOSIS — M16.11 PRIMARY OSTEOARTHRITIS OF RIGHT HIP: ICD-10-CM

## 2025-07-18 DIAGNOSIS — R10.31 RIGHT GROIN PAIN: ICD-10-CM

## 2025-07-18 NOTE — TELEPHONE ENCOUNTER
Patient is agreeable to the MRI.  I provided her the number to schedule.  She has taken meloxicam previously and does not like the way it makes her feel.

## 2025-07-18 NOTE — TELEPHONE ENCOUNTER
Recommend MRI of R hip to see if cartilage loss is more pronounced than what is seen on xray.  Consider hyaluronic acid injection.  Could prescribe meloxicam daily prn.

## 2025-07-18 NOTE — TELEPHONE ENCOUNTER
Patient reports this last hip cortisone injection on 6/26 did not help.  She would like to know what else she can do.

## 2025-07-30 ENCOUNTER — OFFICE VISIT (OUTPATIENT)
Dept: URGENT CARE | Age: 70
End: 2025-07-30
Payer: MEDICARE

## 2025-07-30 VITALS
HEART RATE: 77 BPM | DIASTOLIC BLOOD PRESSURE: 78 MMHG | TEMPERATURE: 97.8 F | HEIGHT: 64 IN | BODY MASS INDEX: 26.46 KG/M2 | WEIGHT: 155 LBS | RESPIRATION RATE: 16 BRPM | SYSTOLIC BLOOD PRESSURE: 134 MMHG

## 2025-07-30 DIAGNOSIS — R82.998 URINE LEUKOCYTES: Primary | ICD-10-CM

## 2025-07-30 DIAGNOSIS — R35.0 FREQUENT URINATION: ICD-10-CM

## 2025-07-30 LAB
POC APPEARANCE, URINE: CLEAR
POC BILIRUBIN, URINE: NEGATIVE
POC BLOOD, URINE: ABNORMAL
POC COLOR, URINE: YELLOW
POC GLUCOSE, URINE: NEGATIVE MG/DL
POC KETONES, URINE: NEGATIVE MG/DL
POC LEUKOCYTES, URINE: ABNORMAL
POC NITRITE,URINE: NEGATIVE
POC PH, URINE: 6 PH
POC PROTEIN, URINE: NEGATIVE MG/DL
POC SPECIFIC GRAVITY, URINE: 1.01
POC UROBILINOGEN, URINE: 0.2 EU/DL

## 2025-07-30 PROCEDURE — 1160F RVW MEDS BY RX/DR IN RCRD: CPT | Performed by: PHYSICIAN ASSISTANT

## 2025-07-30 PROCEDURE — 81003 URINALYSIS AUTO W/O SCOPE: CPT | Performed by: PHYSICIAN ASSISTANT

## 2025-07-30 PROCEDURE — 99213 OFFICE O/P EST LOW 20 MIN: CPT | Performed by: PHYSICIAN ASSISTANT

## 2025-07-30 PROCEDURE — 1159F MED LIST DOCD IN RCRD: CPT | Performed by: PHYSICIAN ASSISTANT

## 2025-07-30 PROCEDURE — 3008F BODY MASS INDEX DOCD: CPT | Performed by: PHYSICIAN ASSISTANT

## 2025-07-30 RX ORDER — NITROFURANTOIN 25; 75 MG/1; MG/1
100 CAPSULE ORAL 2 TIMES DAILY
Qty: 14 CAPSULE | Refills: 0 | Status: SHIPPED | OUTPATIENT
Start: 2025-07-30 | End: 2025-08-06

## 2025-07-30 ASSESSMENT — ENCOUNTER SYMPTOMS
HEMATURIA: 0
LOSS OF SENSATION IN FEET: 0
ABDOMINAL PAIN: 0
BACK PAIN: 0
CHILLS: 0
NAUSEA: 0
FREQUENCY: 1
DEPRESSION: 0
FEVER: 0
DIAPHORESIS: 0
SHORTNESS OF BREATH: 0
VOMITING: 0
DYSURIA: 1
OCCASIONAL FEELINGS OF UNSTEADINESS: 0

## 2025-07-30 ASSESSMENT — PATIENT HEALTH QUESTIONNAIRE - PHQ9
SUM OF ALL RESPONSES TO PHQ9 QUESTIONS 1 AND 2: 0
1. LITTLE INTEREST OR PLEASURE IN DOING THINGS: NOT AT ALL
2. FEELING DOWN, DEPRESSED OR HOPELESS: NOT AT ALL

## 2025-07-30 NOTE — PROGRESS NOTES
"Subjective   Patient ID: Michael Glover is a 69 y.o. female. They present today with a chief complaint of UTI (Pt presents with possible UTI; frequent urination since yesterday. ).    History of Present Illness  Dysuria, frequency, bladder pressure started yesterday.    Denies fever, chills, sweats, nausea, vomiting, abd pain, hematuria, chest pain, SOB, back pain.     UTI (Pt presents with possible UTI; frequent urination since yesterday. )      UTI  Associated symptoms: no abdominal pain, no chest pain, no fever, no nausea, no shortness of breath and no vomiting        Past Medical History  Allergies as of 07/30/2025 - Reviewed 07/30/2025   Allergen Reaction Noted    Sulfa (sulfonamide antibiotics) Unknown 06/30/2021       Prescriptions Prior to Admission[1]     Medical History[2]    Surgical History[3]     reports that she has quit smoking. Her smoking use included cigarettes. She has never been exposed to tobacco smoke. She has never used smokeless tobacco. She reports that she does not currently use alcohol after a past usage of about 1.0 standard drink of alcohol per week. She reports that she does not use drugs.    Review of Systems  Review of Systems   Constitutional:  Negative for chills, diaphoresis and fever.   Respiratory:  Negative for shortness of breath.    Cardiovascular:  Negative for chest pain.   Gastrointestinal:  Negative for abdominal pain, nausea and vomiting.   Genitourinary:  Positive for dysuria and frequency. Negative for hematuria.   Musculoskeletal:  Negative for back pain.   All other systems reviewed and are negative.                                 Objective    Vitals:    07/30/25 1052   BP: 134/78   BP Location: Right arm   Patient Position: Sitting   BP Cuff Size: Adult   Pulse: 77   Resp: 16   Temp: 36.6 °C (97.8 °F)   TempSrc: Oral   Weight: 70.3 kg (155 lb)   Height: 1.626 m (5' 4\")     No LMP recorded. Patient is postmenopausal.    Physical Exam  Vitals and nursing note " reviewed.   Constitutional:       General: She is not in acute distress.     Appearance: Normal appearance.     Cardiovascular:      Rate and Rhythm: Normal rate and regular rhythm.      Heart sounds: Normal heart sounds.   Pulmonary:      Effort: Pulmonary effort is normal.      Breath sounds: Normal breath sounds.   Abdominal:      Palpations: Abdomen is soft.      Tenderness: There is no abdominal tenderness. There is no right CVA tenderness, left CVA tenderness or guarding.   Genitourinary:     Comments: bladder pressure with palp    Neurological:      Mental Status: She is alert.         Procedures    Point of Care Test & Imaging Results from this visit  Results for orders placed or performed in visit on 07/30/25   POCT UA Automated manually resulted   Result Value Ref Range    POC Color, Urine Yellow Straw, Yellow, Light-Yellow    POC Appearance, Urine Clear Clear    POC Glucose, Urine NEGATIVE NEGATIVE mg/dl    POC Bilirubin, Urine NEGATIVE NEGATIVE    POC Ketones, Urine NEGATIVE NEGATIVE mg/dl    POC Specific Gravity, Urine 1.010 1.005 - 1.035    POC Blood, Urine TRACE-Lysed (A) NEGATIVE    POC PH, Urine 6.0 No Reference Range Established PH    POC Protein, Urine NEGATIVE NEGATIVE mg/dl    POC Urobilinogen, Urine 0.2 0.2, 1.0 EU/DL    Poc Nitrite, Urine NEGATIVE NEGATIVE    POC Leukocytes, Urine LARGE (3+) (A) NEGATIVE      Imaging  No results found.    Cardiology, Vascular, and Other Imaging  No other imaging results found for the past 2 days      Diagnostic study results (if any) were reviewed by Renetta Barrientos PA-C.    Assessment/Plan   Allergies, medications, history, and pertinent labs/EKGs/Imaging reviewed by Renetta Barrientos PA-C.     Orders and Diagnoses  Diagnoses and all orders for this visit:  Frequent urination  -     POCT UA Automated manually resulted  -     Urine Culture      Medical Admin Record      Patient disposition: Home    Electronically signed by Renetta Barrientos PA-C  11:09 AM           [1]  (Not in a hospital admission)  [2]   Past Medical History:  Diagnosis Date    Personal history of other benign neoplasm 03/27/2021    History of prolactinoma   [3]   Past Surgical History:  Procedure Laterality Date    OTHER SURGICAL HISTORY  03/27/2021    Pituitary surgery

## 2025-07-30 NOTE — PATIENT INSTRUCTIONS
Finish all antibiotics, macrobid.     Follow up with primary care if no improvement in 2 - 3 days.

## 2025-07-31 ENCOUNTER — APPOINTMENT (OUTPATIENT)
Dept: PHYSICAL THERAPY | Facility: CLINIC | Age: 70
End: 2025-07-31
Payer: MEDICARE

## 2025-07-31 DIAGNOSIS — M62.81 MUSCLE WEAKNESS ON EXAMINATION: ICD-10-CM

## 2025-07-31 DIAGNOSIS — M25.551 RIGHT HIP PAIN: ICD-10-CM

## 2025-08-02 LAB — BACTERIA UR CULT: ABNORMAL

## 2025-08-04 ENCOUNTER — HOSPITAL ENCOUNTER (OUTPATIENT)
Dept: RADIOLOGY | Facility: CLINIC | Age: 70
Discharge: HOME | End: 2025-08-04
Payer: MEDICARE

## 2025-08-04 ENCOUNTER — RESULTS FOLLOW-UP (OUTPATIENT)
Dept: ORTHOPEDIC SURGERY | Facility: CLINIC | Age: 70
End: 2025-08-04
Payer: MEDICARE

## 2025-08-04 DIAGNOSIS — M87.051 AVASCULAR NECROSIS OF FEMORAL HEAD, RIGHT (MULTI): ICD-10-CM

## 2025-08-04 DIAGNOSIS — R10.31 RIGHT GROIN PAIN: ICD-10-CM

## 2025-08-04 DIAGNOSIS — M16.11 PRIMARY OSTEOARTHRITIS OF RIGHT HIP: ICD-10-CM

## 2025-08-04 DIAGNOSIS — M16.11 PRIMARY OSTEOARTHRITIS OF RIGHT HIP: Primary | ICD-10-CM

## 2025-08-04 PROCEDURE — 73721 MRI JNT OF LWR EXTRE W/O DYE: CPT | Mod: RT

## 2025-08-04 NOTE — TELEPHONE ENCOUNTER
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  Recommending she be evaluated by one of our hip replacement or trauma surgeons to discuss arthroplasty.  I will have my staff send over this information for scheduling.

## 2025-08-13 ENCOUNTER — OFFICE VISIT (OUTPATIENT)
Dept: PRIMARY CARE | Facility: CLINIC | Age: 70
End: 2025-08-13
Payer: MEDICARE

## 2025-08-13 VITALS
WEIGHT: 170 LBS | BODY MASS INDEX: 33.38 KG/M2 | DIASTOLIC BLOOD PRESSURE: 62 MMHG | OXYGEN SATURATION: 98 % | HEIGHT: 60 IN | HEART RATE: 83 BPM | SYSTOLIC BLOOD PRESSURE: 128 MMHG | TEMPERATURE: 95.6 F

## 2025-08-13 DIAGNOSIS — G89.29 CHRONIC BILATERAL LOW BACK PAIN WITHOUT SCIATICA: ICD-10-CM

## 2025-08-13 DIAGNOSIS — R39.9 UTI SYMPTOMS: Primary | ICD-10-CM

## 2025-08-13 DIAGNOSIS — M54.50 CHRONIC BILATERAL LOW BACK PAIN WITHOUT SCIATICA: ICD-10-CM

## 2025-08-13 LAB
POC APPEARANCE, URINE: CLEAR
POC BILIRUBIN, URINE: NEGATIVE
POC BLOOD, URINE: NEGATIVE
POC COLOR, URINE: YELLOW
POC GLUCOSE, URINE: NEGATIVE MG/DL
POC KETONES, URINE: NEGATIVE MG/DL
POC LEUKOCYTES, URINE: ABNORMAL
POC NITRITE,URINE: NEGATIVE
POC PH, URINE: 6 PH
POC PROTEIN, URINE: NEGATIVE MG/DL
POC SPECIFIC GRAVITY, URINE: 1.01
POC UROBILINOGEN, URINE: 0.2 EU/DL

## 2025-08-13 PROCEDURE — 81003 URINALYSIS AUTO W/O SCOPE: CPT | Performed by: FAMILY MEDICINE

## 2025-08-13 PROCEDURE — 1159F MED LIST DOCD IN RCRD: CPT | Performed by: FAMILY MEDICINE

## 2025-08-13 PROCEDURE — 3008F BODY MASS INDEX DOCD: CPT | Performed by: FAMILY MEDICINE

## 2025-08-13 PROCEDURE — 99213 OFFICE O/P EST LOW 20 MIN: CPT | Performed by: FAMILY MEDICINE

## 2025-08-13 PROCEDURE — 1160F RVW MEDS BY RX/DR IN RCRD: CPT | Performed by: FAMILY MEDICINE

## 2025-08-13 RX ORDER — CIPROFLOXACIN 250 MG/1
250 TABLET, FILM COATED ORAL 2 TIMES DAILY
Qty: 14 TABLET | Refills: 0 | Status: SHIPPED | OUTPATIENT
Start: 2025-08-13 | End: 2025-08-20

## 2025-08-13 RX ORDER — HYDROCODONE BITARTRATE AND ACETAMINOPHEN 7.5; 325 MG/1; MG/1
1 TABLET ORAL EVERY 8 HOURS
Qty: 20 TABLET | Refills: 0 | Status: SHIPPED | OUTPATIENT
Start: 2025-08-13

## 2025-08-13 ASSESSMENT — ENCOUNTER SYMPTOMS
DEPRESSION: 0
LOSS OF SENSATION IN FEET: 0
OCCASIONAL FEELINGS OF UNSTEADINESS: 0

## 2025-08-18 ENCOUNTER — HOSPITAL ENCOUNTER (EMERGENCY)
Facility: HOSPITAL | Age: 70
Discharge: HOME | End: 2025-08-18
Attending: EMERGENCY MEDICINE
Payer: MEDICARE

## 2025-08-18 VITALS
WEIGHT: 160 LBS | RESPIRATION RATE: 18 BRPM | OXYGEN SATURATION: 99 % | DIASTOLIC BLOOD PRESSURE: 57 MMHG | HEIGHT: 61 IN | HEART RATE: 74 BPM | SYSTOLIC BLOOD PRESSURE: 112 MMHG | TEMPERATURE: 98 F | BODY MASS INDEX: 30.21 KG/M2

## 2025-08-18 DIAGNOSIS — G89.29 CHRONIC RIGHT-SIDED LOW BACK PAIN WITH RIGHT-SIDED SCIATICA: Primary | ICD-10-CM

## 2025-08-18 DIAGNOSIS — M54.41 CHRONIC RIGHT-SIDED LOW BACK PAIN WITH RIGHT-SIDED SCIATICA: Primary | ICD-10-CM

## 2025-08-18 PROCEDURE — 99284 EMERGENCY DEPT VISIT MOD MDM: CPT | Performed by: EMERGENCY MEDICINE

## 2025-08-18 PROCEDURE — 96372 THER/PROPH/DIAG INJ SC/IM: CPT | Performed by: EMERGENCY MEDICINE

## 2025-08-18 PROCEDURE — 2500000004 HC RX 250 GENERAL PHARMACY W/ HCPCS (ALT 636 FOR OP/ED): Performed by: EMERGENCY MEDICINE

## 2025-08-18 RX ORDER — METHYLPREDNISOLONE 4 MG/1
TABLET ORAL
Qty: 21 TABLET | Refills: 0 | Status: SHIPPED | OUTPATIENT
Start: 2025-08-18 | End: 2025-08-24

## 2025-08-18 RX ORDER — LIDOCAINE 50 MG/G
1 PATCH TOPICAL DAILY
Qty: 30 PATCH | Refills: 0 | Status: SHIPPED | OUTPATIENT
Start: 2025-08-18 | End: 2025-09-17

## 2025-08-18 RX ORDER — KETOROLAC TROMETHAMINE 30 MG/ML
15 INJECTION, SOLUTION INTRAMUSCULAR; INTRAVENOUS ONCE
Status: COMPLETED | OUTPATIENT
Start: 2025-08-18 | End: 2025-08-18

## 2025-08-18 RX ORDER — ORPHENADRINE CITRATE 30 MG/ML
60 INJECTION INTRAMUSCULAR; INTRAVENOUS ONCE
Status: COMPLETED | OUTPATIENT
Start: 2025-08-18 | End: 2025-08-18

## 2025-08-18 RX ORDER — KETOROLAC TROMETHAMINE 10 MG/1
10 TABLET, FILM COATED ORAL EVERY 8 HOURS PRN
Qty: 15 TABLET | Refills: 0 | Status: SHIPPED | OUTPATIENT
Start: 2025-08-18 | End: 2025-08-23

## 2025-08-18 RX ORDER — ORPHENADRINE CITRATE 100 MG/1
100 TABLET, EXTENDED RELEASE ORAL 2 TIMES DAILY PRN
Qty: 20 TABLET | Refills: 0 | Status: SHIPPED | OUTPATIENT
Start: 2025-08-18 | End: 2025-08-28

## 2025-08-18 RX ADMIN — ORPHENADRINE CITRATE 60 MG: 60 INJECTION INTRAMUSCULAR; INTRAVENOUS at 14:01

## 2025-08-18 RX ADMIN — KETOROLAC TROMETHAMINE 15 MG: 30 INJECTION, SOLUTION INTRAMUSCULAR at 14:01

## 2025-08-18 ASSESSMENT — PAIN - FUNCTIONAL ASSESSMENT: PAIN_FUNCTIONAL_ASSESSMENT: 0-10

## 2025-08-18 ASSESSMENT — PAIN DESCRIPTION - LOCATION: LOCATION: BACK

## 2025-08-18 ASSESSMENT — PAIN SCALES - GENERAL: PAINLEVEL_OUTOF10: 8

## 2025-08-18 ASSESSMENT — PAIN DESCRIPTION - PAIN TYPE: TYPE: ACUTE PAIN

## 2025-08-20 ENCOUNTER — APPOINTMENT (OUTPATIENT)
Dept: RADIOLOGY | Facility: CLINIC | Age: 70
End: 2025-08-20
Payer: MEDICARE

## 2025-08-20 DIAGNOSIS — Z12.31 SCREENING MAMMOGRAM FOR BREAST CANCER: ICD-10-CM

## 2025-08-20 PROCEDURE — 77063 BREAST TOMOSYNTHESIS BI: CPT

## 2025-08-20 PROCEDURE — 77063 BREAST TOMOSYNTHESIS BI: CPT | Performed by: RADIOLOGY

## 2025-08-20 PROCEDURE — 77067 SCR MAMMO BI INCL CAD: CPT | Performed by: RADIOLOGY

## 2025-08-24 DIAGNOSIS — M54.16 LUMBAR RADICULOPATHY: ICD-10-CM

## 2025-08-25 RX ORDER — GABAPENTIN 300 MG/1
300 CAPSULE ORAL 3 TIMES DAILY
Qty: 90 CAPSULE | Refills: 2 | Status: SHIPPED | OUTPATIENT
Start: 2025-08-25

## 2025-08-26 ENCOUNTER — APPOINTMENT (OUTPATIENT)
Dept: ORTHOPEDIC SURGERY | Facility: CLINIC | Age: 70
End: 2025-08-26
Payer: MEDICARE

## 2025-08-26 DIAGNOSIS — M17.11 LOCALIZED OSTEOARTHRITIS OF RIGHT KNEE: Primary | ICD-10-CM

## 2025-08-26 PROCEDURE — 20610 DRAIN/INJ JOINT/BURSA W/O US: CPT | Performed by: FAMILY MEDICINE

## 2025-08-26 PROCEDURE — 1159F MED LIST DOCD IN RCRD: CPT | Performed by: FAMILY MEDICINE

## 2025-08-26 PROCEDURE — 1125F AMNT PAIN NOTED PAIN PRSNT: CPT | Performed by: FAMILY MEDICINE

## 2025-08-26 PROCEDURE — 99213 OFFICE O/P EST LOW 20 MIN: CPT | Performed by: FAMILY MEDICINE

## 2025-08-26 RX ORDER — TRIAMCINOLONE ACETONIDE 40 MG/ML
40 INJECTION, SUSPENSION INTRA-ARTICULAR; INTRAMUSCULAR
Status: COMPLETED | OUTPATIENT
Start: 2025-08-26 | End: 2025-08-26

## 2025-08-26 RX ORDER — LIDOCAINE HYDROCHLORIDE 10 MG/ML
4 INJECTION, SOLUTION INFILTRATION; PERINEURAL
Status: COMPLETED | OUTPATIENT
Start: 2025-08-26 | End: 2025-08-26

## 2025-08-26 RX ADMIN — LIDOCAINE HYDROCHLORIDE 4 ML: 10 INJECTION, SOLUTION INFILTRATION; PERINEURAL at 13:39

## 2025-08-26 RX ADMIN — TRIAMCINOLONE ACETONIDE 40 MG: 40 INJECTION, SUSPENSION INTRA-ARTICULAR; INTRAMUSCULAR at 13:39

## 2025-08-26 ASSESSMENT — PAIN - FUNCTIONAL ASSESSMENT: PAIN_FUNCTIONAL_ASSESSMENT: 0-10

## 2025-08-26 ASSESSMENT — PAIN SCALES - GENERAL: PAINLEVEL_OUTOF10: 7

## 2025-09-04 ENCOUNTER — OFFICE VISIT (OUTPATIENT)
Dept: ORTHOPEDIC SURGERY | Facility: HOSPITAL | Age: 70
End: 2025-09-04
Payer: MEDICARE

## 2025-09-04 ENCOUNTER — APPOINTMENT (OUTPATIENT)
Dept: ORTHOPEDIC SURGERY | Facility: HOSPITAL | Age: 70
End: 2025-09-04
Payer: MEDICARE

## 2025-09-04 ENCOUNTER — HOSPITAL ENCOUNTER (OUTPATIENT)
Dept: RADIOLOGY | Facility: HOSPITAL | Age: 70
Discharge: HOME | End: 2025-09-04
Payer: MEDICARE

## 2025-09-04 VITALS — WEIGHT: 160 LBS | HEIGHT: 65 IN | BODY MASS INDEX: 26.66 KG/M2

## 2025-09-04 DIAGNOSIS — M16.11 ARTHRITIS OF RIGHT HIP: ICD-10-CM

## 2025-09-04 DIAGNOSIS — M54.16 LUMBAR RADICULOPATHY: ICD-10-CM

## 2025-09-04 DIAGNOSIS — M16.11 ARTHRITIS OF RIGHT HIP: Primary | ICD-10-CM

## 2025-09-04 PROCEDURE — 99204 OFFICE O/P NEW MOD 45 MIN: CPT | Performed by: ORTHOPAEDIC SURGERY

## 2025-09-04 PROCEDURE — 1159F MED LIST DOCD IN RCRD: CPT | Performed by: ORTHOPAEDIC SURGERY

## 2025-09-04 PROCEDURE — 1160F RVW MEDS BY RX/DR IN RCRD: CPT | Performed by: ORTHOPAEDIC SURGERY

## 2025-09-04 PROCEDURE — 73502 X-RAY EXAM HIP UNI 2-3 VIEWS: CPT | Mod: RT

## 2025-09-04 PROCEDURE — 99212 OFFICE O/P EST SF 10 MIN: CPT

## 2025-09-04 PROCEDURE — 73502 X-RAY EXAM HIP UNI 2-3 VIEWS: CPT | Mod: RIGHT SIDE | Performed by: RADIOLOGY

## 2025-09-04 PROCEDURE — 3008F BODY MASS INDEX DOCD: CPT | Performed by: ORTHOPAEDIC SURGERY

## 2025-09-04 PROCEDURE — 1125F AMNT PAIN NOTED PAIN PRSNT: CPT | Performed by: ORTHOPAEDIC SURGERY

## 2025-09-04 RX ORDER — CEFAZOLIN SODIUM 2 G/50ML
2 SOLUTION INTRAVENOUS ONCE
OUTPATIENT
Start: 2025-09-04 | End: 2025-09-04

## 2025-09-04 RX ORDER — NAPROXEN 500 MG/1
500 TABLET ORAL 2 TIMES DAILY
Qty: 60 TABLET | Refills: 0 | Status: SHIPPED | OUTPATIENT
Start: 2025-09-04 | End: 2025-10-04

## 2025-09-04 ASSESSMENT — PAIN - FUNCTIONAL ASSESSMENT: PAIN_FUNCTIONAL_ASSESSMENT: 0-10

## 2025-09-04 ASSESSMENT — PAIN SCALES - GENERAL: PAINLEVEL_OUTOF10: 8

## 2025-09-05 PROBLEM — M16.11 ARTHRITIS OF RIGHT HIP: Status: ACTIVE | Noted: 2025-09-04

## 2025-10-03 ENCOUNTER — APPOINTMENT (OUTPATIENT)
Dept: ORTHOPEDIC SURGERY | Facility: HOSPITAL | Age: 70
End: 2025-10-03
Payer: MEDICARE

## 2025-10-03 ENCOUNTER — APPOINTMENT (OUTPATIENT)
Dept: NEUROLOGY | Facility: CLINIC | Age: 70
End: 2025-10-03
Payer: MEDICARE